# Patient Record
Sex: MALE | Race: WHITE | NOT HISPANIC OR LATINO | Employment: FULL TIME | URBAN - METROPOLITAN AREA
[De-identification: names, ages, dates, MRNs, and addresses within clinical notes are randomized per-mention and may not be internally consistent; named-entity substitution may affect disease eponyms.]

---

## 2017-08-25 ENCOUNTER — HOSPITAL ENCOUNTER (OUTPATIENT)
Facility: HOSPITAL | Age: 47
Discharge: HOME | End: 2017-08-25
Attending: EMERGENCY MEDICINE | Admitting: INTERNAL MEDICINE

## 2017-08-25 VITALS
SYSTOLIC BLOOD PRESSURE: 134 MMHG | RESPIRATION RATE: 18 BRPM | TEMPERATURE: 98 F | BODY MASS INDEX: 33.32 KG/M2 | OXYGEN SATURATION: 95 % | HEIGHT: 71 IN | WEIGHT: 238 LBS | DIASTOLIC BLOOD PRESSURE: 88 MMHG | HEART RATE: 54 BPM

## 2017-08-25 DIAGNOSIS — R07.9 CHEST PAIN: ICD-10-CM

## 2017-08-25 DIAGNOSIS — R07.9 CHEST PAIN, UNSPECIFIED TYPE: Primary | ICD-10-CM

## 2017-08-25 LAB
ALBUMIN SERPL BCP-MCNC: 3.7 G/DL
ALP SERPL-CCNC: 71 U/L
ALT SERPL W/O P-5'-P-CCNC: 28 U/L
AMPHET+METHAMPHET UR QL: NEGATIVE
ANION GAP SERPL CALC-SCNC: 10 MMOL/L
AST SERPL-CCNC: 28 U/L
BARBITURATES UR QL SCN>200 NG/ML: NEGATIVE
BASOPHILS # BLD AUTO: 0.3 K/UL
BASOPHILS NFR BLD: 3.5 %
BENZODIAZ UR QL SCN>200 NG/ML: NEGATIVE
BILIRUB SERPL-MCNC: 0.7 MG/DL
BNP SERPL-MCNC: 21 PG/ML
BUN SERPL-MCNC: 19 MG/DL
BZE UR QL SCN: NEGATIVE
CALCIUM SERPL-MCNC: 9 MG/DL
CANNABINOIDS UR QL SCN: NEGATIVE
CHLORIDE SERPL-SCNC: 107 MMOL/L
CHOLEST/HDLC SERPL: 3.3 {RATIO}
CO2 SERPL-SCNC: 23 MMOL/L
CREAT SERPL-MCNC: 1 MG/DL
CREAT UR-MCNC: 162.6 MG/DL
D DIMER PPP IA.FEU-MCNC: 0.2 MG/L FEU
DIASTOLIC DYSFUNCTION: NO
DIFFERENTIAL METHOD: ABNORMAL
EOSINOPHIL # BLD AUTO: 0.3 K/UL
EOSINOPHIL NFR BLD: 3 %
ERYTHROCYTE [DISTWIDTH] IN BLOOD BY AUTOMATED COUNT: 13.6 %
EST. GFR  (AFRICAN AMERICAN): >60 ML/MIN/1.73 M^2
EST. GFR  (NON AFRICAN AMERICAN): >60 ML/MIN/1.73 M^2
GLUCOSE SERPL-MCNC: 100 MG/DL
HCT VFR BLD AUTO: 45.1 %
HDL/CHOLESTEROL RATIO: 29.9 %
HDLC SERPL-MCNC: 184 MG/DL
HDLC SERPL-MCNC: 55 MG/DL
HGB BLD-MCNC: 15.1 G/DL
LDLC SERPL CALC-MCNC: 103 MG/DL
LYMPHOCYTES # BLD AUTO: 2.7 K/UL
LYMPHOCYTES NFR BLD: 32.2 %
MCH RBC QN AUTO: 30.6 PG
MCHC RBC AUTO-ENTMCNC: 33.5 G/DL
MCV RBC AUTO: 91 FL
METHADONE UR QL SCN>300 NG/ML: NEGATIVE
MONOCYTES # BLD AUTO: 0.7 K/UL
MONOCYTES NFR BLD: 8.4 %
NEUTROPHILS # BLD AUTO: 4.5 K/UL
NEUTROPHILS NFR BLD: 52.9 %
NONHDLC SERPL-MCNC: 129 MG/DL
OPIATES UR QL SCN: NEGATIVE
PCP UR QL SCN>25 NG/ML: NEGATIVE
PLATELET # BLD AUTO: 210 K/UL
PMV BLD AUTO: 8.5 FL
POTASSIUM SERPL-SCNC: 4.3 MMOL/L
PROT SERPL-MCNC: 7 G/DL
RBC # BLD AUTO: 4.94 M/UL
SODIUM SERPL-SCNC: 140 MMOL/L
TOXICOLOGY INFORMATION: NORMAL
TRIGL SERPL-MCNC: 130 MG/DL
TROPONIN I SERPL DL<=0.01 NG/ML-MCNC: 0.02 NG/ML
TROPONIN I SERPL DL<=0.01 NG/ML-MCNC: <0.006 NG/ML
TSH SERPL DL<=0.005 MIU/L-ACNC: 1.73 UIU/ML
WBC # BLD AUTO: 8.5 K/UL

## 2017-08-25 PROCEDURE — 80053 COMPREHEN METABOLIC PANEL: CPT

## 2017-08-25 PROCEDURE — 80307 DRUG TEST PRSMV CHEM ANLYZR: CPT

## 2017-08-25 PROCEDURE — 84484 ASSAY OF TROPONIN QUANT: CPT | Mod: 91

## 2017-08-25 PROCEDURE — G0378 HOSPITAL OBSERVATION PER HR: HCPCS

## 2017-08-25 PROCEDURE — 99285 EMERGENCY DEPT VISIT HI MDM: CPT

## 2017-08-25 PROCEDURE — 25000003 PHARM REV CODE 250: Performed by: EMERGENCY MEDICINE

## 2017-08-25 PROCEDURE — 85025 COMPLETE CBC W/AUTO DIFF WBC: CPT

## 2017-08-25 PROCEDURE — 85379 FIBRIN DEGRADATION QUANT: CPT

## 2017-08-25 PROCEDURE — 84443 ASSAY THYROID STIM HORMONE: CPT

## 2017-08-25 PROCEDURE — 99235 HOSP IP/OBS SAME DATE MOD 70: CPT | Mod: ,,, | Performed by: INTERNAL MEDICINE

## 2017-08-25 PROCEDURE — 94761 N-INVAS EAR/PLS OXIMETRY MLT: CPT

## 2017-08-25 PROCEDURE — 36415 COLL VENOUS BLD VENIPUNCTURE: CPT

## 2017-08-25 PROCEDURE — 80061 LIPID PANEL: CPT

## 2017-08-25 PROCEDURE — 99900035 HC TECH TIME PER 15 MIN (STAT)

## 2017-08-25 PROCEDURE — 83880 ASSAY OF NATRIURETIC PEPTIDE: CPT

## 2017-08-25 PROCEDURE — 93005 ELECTROCARDIOGRAM TRACING: CPT

## 2017-08-25 RX ORDER — SODIUM CHLORIDE 0.9 % (FLUSH) 0.9 %
3 SYRINGE (ML) INJECTION EVERY 8 HOURS
Status: DISCONTINUED | OUTPATIENT
Start: 2017-08-25 | End: 2017-08-25 | Stop reason: HOSPADM

## 2017-08-25 RX ORDER — ASPIRIN 325 MG
325 TABLET ORAL
Status: COMPLETED | OUTPATIENT
Start: 2017-08-25 | End: 2017-08-25

## 2017-08-25 RX ORDER — AMOXICILLIN 250 MG
1 CAPSULE ORAL 2 TIMES DAILY
Status: DISCONTINUED | OUTPATIENT
Start: 2017-08-25 | End: 2017-08-25 | Stop reason: HOSPADM

## 2017-08-25 RX ORDER — MORPHINE SULFATE 2 MG/ML
2 INJECTION, SOLUTION INTRAMUSCULAR; INTRAVENOUS EVERY 4 HOURS PRN
Status: DISCONTINUED | OUTPATIENT
Start: 2017-08-25 | End: 2017-08-25 | Stop reason: HOSPADM

## 2017-08-25 RX ORDER — ACETAMINOPHEN 325 MG/1
650 TABLET ORAL EVERY 4 HOURS PRN
Status: DISCONTINUED | OUTPATIENT
Start: 2017-08-25 | End: 2017-08-25 | Stop reason: HOSPADM

## 2017-08-25 RX ORDER — ONDANSETRON 2 MG/ML
4 INJECTION INTRAMUSCULAR; INTRAVENOUS EVERY 8 HOURS PRN
Status: DISCONTINUED | OUTPATIENT
Start: 2017-08-25 | End: 2017-08-25 | Stop reason: HOSPADM

## 2017-08-25 RX ORDER — ASPIRIN 325 MG
325 TABLET ORAL DAILY
Status: DISCONTINUED | OUTPATIENT
Start: 2017-08-25 | End: 2017-08-25 | Stop reason: HOSPADM

## 2017-08-25 RX ADMIN — ASPIRIN 325 MG ORAL TABLET 325 MG: 325 PILL ORAL at 04:08

## 2017-08-25 RX ADMIN — LIDOCAINE HYDROCHLORIDE: 20 SOLUTION ORAL; TOPICAL at 05:08

## 2017-08-25 NOTE — PLAN OF CARE
08/25/17 1339   Final Note   Assessment Type Final Discharge Note   Discharge Disposition Home

## 2017-08-25 NOTE — ED PROVIDER NOTES
Encounter Date: 8/25/2017       History     Chief Complaint   Patient presents with    Chest Pain     x 1 hour with progressive worsening; SOB; episode of CP yesterday with diaphoresis     HPI   Patient is a 47-year-old man with no significant past medical history presents emergency department complaining of episodes of chest pain.  Patient states yesterday morning while driving he had a episode of chest pain that lasted approximately one hour.  The pain was described as substernal, deep pain which was severe and associated with shortness of breath, nausea and diaphoresis.  He began to have another episode this morning that awoke him from sleep around 3:30 AM similar to yesterday's episode yet not as severe.  He denies tobacco use.  No family history of MIs.  Review of patient's allergies indicates:   Allergen Reactions    Pcn [penicillins] Anaphylaxis     History reviewed. No pertinent past medical history.  History reviewed. No pertinent surgical history.  History reviewed. No pertinent family history.  Social History   Substance Use Topics    Smoking status: Never Smoker    Smokeless tobacco: Never Used    Alcohol use Not on file     Review of Systems  REVIEW OF SYSTEMS  CONSTITUTIONAL: Negative for fever.  Positive for diaphoresis  HEENT:  Negative for sore throat.   HEART:   Positive for chest pain  LUNG:  Positive for shortness of breath.  ABDOMEN: Positive for nausea  :  No discharge, dysuria  EXTREMITIES:  No swelling  NEURO:  Negative for weakness.   SKIN:  Negative for rash.  Psych: No depression  HEME: Does not bruise/bleed easily.           Physical Exam     Initial Vitals [08/25/17 0438]   BP Pulse Resp Temp SpO2   134/83 68 16 97.6 °F (36.4 °C) --      MAP       100         Physical Exam  Physical Exam   Nurses notes and vitals reviewed.  Constitutional:Oriented to person, place, and time. Appears well-developed and well-nourished and in no acute distress. Answering all questions appropriate    HEENT: PERRL, EOMI, Conjunctivae are normal. Moist mucous membranes. Normocephalic. Atraumatic, no acute, traumatic or infectious process noted.  Neck: Normal range of motion, supple, no JVD.  Cardiovascular: Normal rate, regular rhythm, normal heart sounds and intact distal pulses.   Pulmonary/Chest: Effort normal and breath sounds normal. No respiratory distress. No wheezes,  rales or ronchi.   Abdominal: Soft, no distension. There is no tenderness, rebound or gaurding.  No Odonnell's, Rovsing's, or McBurney's point tenderness.  No CVA tenderness.   Back:  No midline TTP; no acute abnormal, traumatic or infectious process noted  Musculoskeletal: Moves all extremities well.  Full range of motion.  5/5 strength.  No sensory deficits.  No C/C/E.  2+ pulses throughout  Neurological: Alert and oriented to person, place, and time. Cranial nerves II through XII are grossly intact without anyfocal motor, sensory, and cerebellar findings.  Skin: Skin is warm and dry, no rashes.  Psych: Full affect, cooperative      ED Course   Procedures  Labs Reviewed   CBC W/ AUTO DIFFERENTIAL - Abnormal; Notable for the following:        Result Value    MPV 8.5 (*)     Baso # 0.30 (*)     Basophil% 3.5 (*)     All other components within normal limits   COMPREHENSIVE METABOLIC PANEL   TROPONIN I   B-TYPE NATRIURETIC PEPTIDE   DRUG SCREEN PANEL, URINE EMERGENCY   D DIMER, QUANTITATIVE   TROPONIN I   TSH   LIPID PANEL     EKG Readings: (Independently Interpreted)   Initial Reading: No STEMI.   Sinus bradycardia, 58 bpm, normal ST segments, normal T waves, otherwise normal EKG          Medical Decision Making:   History:   Old Medical Records: I decided to obtain old medical records.               Assessment/Plan:  Kong Lamb is a 47 y.o. male with chest pain.    This is an emergent evaluation.  Patient is complaining of chest pain.  My differential diagnosis includes: Acute MI, unstable angina, stable angina, congestive heart failure,  pneumonia, pneumothorax, COPD/asthma, bronchitis, and mass.    Clinically, the patient does not have any symptoms of bronchitis, asthma, or COPD exacerbation.    An EKG was performed and was negative for ST segment elevation.  A chest x-ray was performed and was negative for signs of heart failure or pulmonary edema.  There was no evidence of pneumonia or pneumothorax or mass lesion.    Cardiac markers-troponin and BNP-are both negative.  No evidence for NSTEMI or CHF.    This patient has multiple cardiac risk factors. Risk stratification with serial cardiac markers and stress testing is warranted in this patient.  I believe the patient should be admitted for observation to the definitively rule out acute coronary syndrome.    I discussed the patient's presentation and workup with the hospitalist who agrees with placing the patient in the hospital.  I have placed orders for the hospitalist.     iTto Chau M.D. 1:31 PM 8/29/2017           ED Course     Clinical Impression:   The primary encounter diagnosis was Chest pain, unspecified type. A diagnosis of Chest pain was also pertinent to this visit.                           Tito Chau MD  08/29/17 [id number]

## 2017-08-25 NOTE — NURSING
Pt arrived to floor from ER at 0825. SB on the monitor in the high 50's. VS stable, see flowsheets. Pt states the GI cocktail in ER improved his CP symptoms. Consult made to Dr. Hartley, Dr. Stewart not on call.

## 2017-08-25 NOTE — H&P
PCP: Primary Doctor No    History & Physical    Chief Complaint: Chest pain - 1 hr PTA    History of Present Illness:  Patient is a 47 y.o. male admitted to Hospitalist Service from [facility] with complaint of chest pain started 1 hr PTA. Patient reported no significant PMH. Patient presented with episodes of chest pain.  Patient stated yesterday morning while driving he had a episode of chest pain that lasted approximately one hour.  The pain was described as substernal, deep pain which was severe and associated with shortness of breath, nausea and diaphoresis.  He began to have another episode this morning that awoke him from sleep around 3:30 AM similar to yesterday's episode yet not as severe.  He denies tobacco use.  Patient denied shortness of breath, abdominal pain, nausea, vomiting, headache, vision changes, focal neuro-deficits, cough or fever.    No past medical history on file.  No past surgical history on file.  No family history on file.  Social History   Substance Use Topics    Smoking status: Not on file    Smokeless tobacco: Not on file    Alcohol use Not on file      Review of patient's allergies indicates:   Allergen Reactions    Pcn [penicillins] Anaphylaxis       (Not in a hospital admission)  Review of Systems:  Constitutional: no fever or chills  Eyes: no visual changes  Ears, nose, mouth, throat, and face: no nasal congestion or sore throat  Respiratory: no cough or shorness of breath  Cardiovascular: see HPI  Gastrointestinal: no nausea or vomiting, no abdominal pain or change in bowel habits  Genitourinary: no hematuria or dysuria  Integument/breast: no rash or pruritis  Hematologic/lymphatic: no easy bruising or lymphadenopathy  Musculoskeletal: no arthralgias or myalgias  Neurological: no seizures or tremors.  Behavioral/Psych: no auditory or visual hallucinations  Endocrine: no heat or cold intolerance     OBJECTIVE:     Vital Signs (Most Recent)  Temp:  97.6 °F (36.4 °C) (08/25/17 0438)  Pulse: (!) 52 (08/25/17 0733)  Resp: 16 (08/25/17 0438)  BP: 111/60 (08/25/17 0733)  SpO2: 98 % (08/25/17 0733)    Physical Exam:  General appearance: well developed, appears stated age  Head: normocephalic, atraumatic  Eyes:  conjunctivae/corneas clear. PERRL.  Nose: Nares normal. Septum midline.  Throat: lips, mucosa, and tongue normal; teeth and gums normal, no throat erythema.  Neck: supple, symmetrical, trachea midline, no JVD and thyroid not enlarged, symmetric, no tenderness/mass/nodules  Lungs:  clear to auscultation bilaterally and normal respiratory effort  Chest wall: no tenderness  Heart: regular rate and rhythm, S1, S2 normal, no murmur, click, rub or gallop  Abdomen: soft, non-tender non-distented; bowel sounds normal; no masses,  no organomegaly  Extremities: no cyanosis, clubbing or edema.   Pulses: 2+ and symmetric  Skin: Skin color, texture, turgor normal. No rashes or lesions.  Lymph nodes: Cervical, supraclavicular, and axillary nodes normal.  Neurologic: Normal strength and tone. No focal numbness or weakness. CNII-XII intact.      Laboratory:   CBC:   Recent Labs  Lab 08/25/17 0457   WBC 8.50   RBC 4.94   HGB 15.1   HCT 45.1      MCV 91   MCH 30.6   MCHC 33.5     CMP:   Recent Labs  Lab 08/25/17 0457      CALCIUM 9.0   ALBUMIN 3.7   PROT 7.0      K 4.3   CO2 23      BUN 19   CREATININE 1.0   ALKPHOS 71   ALT 28   AST 28   BILITOT 0.7   Cardiac markers:   Recent Labs  Lab 08/25/17  0643   TROPONINI <0.006   D-dimer: 0.20  Microbiology Results (last 7 days)     ** No results found for the last 168 hours. **        Microbiology Results (last 7 days)     ** No results found for the last 168 hours. **        Diagnostic Results:  Chest X-Ray: No acute cardiopulmonary disease.      Assessment/Plan:     Active Hospital Problems    Diagnosis  POA    Chest pain [R07.9] - recurrent  Yes      Supplemental O2 via nasal canula; titrate O2  saturation to >92%.  Serial Cardiac enzymes × 3.  Tele-monitoring.   Cardiology Consultation.  Check fasting lipid panel and EKG in AM.  Use Nitroglycerine PRN Chest pain.  Na restriction <2g/d.  Gastric ulcer prophylaxis with gastric acid suppressant.   Deep venous thrombosis prophylaxis.  See Orders.  Dixon Collado MD  Department of Hospital Medicine   [facility]

## 2017-08-25 NOTE — ED NOTES
"Patient identifiers for Kong Lamb checked and correct.  LOC:  Patient is awake, alert, and aware of environment with an appropriate affect. Patient is oriented x 3 and speaking appropriately.  APPEARANCE:  Patient appears anxious but in no acute distress. Patient is clean and well groomed, patient's clothing is properly fastened.  SKIN:  The skin is warm and dry. Patient has normal skin turgor and moist mucus membranes. Skin is intact; no bruising or breakdown noted.  MUSCULOSKELETAL:  Patient is moving all extremities well, no obvious deformities noted. Pulses intact.   RESPIRATORY:  Airway is open and patent. Respirations are spontaneous and non-labored with normal effort and rate.  CARDIAC:  Patient has a normal rate and rhythm. No peripheral edema noted. Capillary refill < 3 seconds. Midsternal chest pain, nonradiating.   ABDOMEN:  No distention noted.  Soft and non-tender upon palpation. Nausea noted.  NEUROLOGICAL:  PERRL. Facial expression is symmetrical. Hand grasps are equal bilaterally. Normal sensation in all extremities when touched with finger.  Allergies reported:    Review of patient's allergies indicates:   Allergen Reactions    Pcn [penicillins] Anaphylaxis     OTHER NOTES: Pt presents to ER for evaluation of midsternal chest pain that started yesterday while driving. Pt reports associated nausea and diaphoresis during episode and describes pain as stabbing and makes him "hunch over in pain". Heart sounds normal, denies SOB. Pt in bed, locked, lowest position, side rails up, no needs noted at this time. NAD, VSS, will continue to monitor.      "

## 2017-08-25 NOTE — ED NOTES
Assumed care:  Patient awake, alert and oriented x 3, skin warm and dry, in NAD.  Attempted to call report.  Nurse will call back.

## 2017-08-25 NOTE — DISCHARGE SUMMARY
Discharge Summary  Hospital Medicine    Admit Date: 8/25/2017    Date and Time: 8/25/20176:13 PM    Discharge Attending Physician: Dixon Collado MD    Primary Care Physician: Primary Doctor No    Diagnoses:  Active Hospital Problems    Diagnosis  POA    *Chest pain [R07.9]  Yes      Resolved Hospital Problems    Diagnosis Date Resolved POA   No resolved problems to display.     Discharged Condition: Good    Hospital Course:   Patient is a 47 y.o. male admitted to Hospitalist Service from [facility] with complaint of chest pain started 1 hr PTA. Patient reported no significant PMH. Patient presented with episodes of chest pain.  Patient stated previous day morning while driving he had a episode of chest pain that lasted approximately one hour.  The pain was described as substernal, deep pain which was severe and associated with shortness of breath, nausea and diaphoresis.  He began to have another episode this morning that awoke him from sleep around 3:30 AM similar to yesterday's episode yet not as severe.  He denies tobacco use.  Patient denied shortness of breath, abdominal pain, nausea, vomiting, headache, vision changes, focal neuro-deficits, cough or fever. Patient was admitted to Hospitalist medicine service. Patient was evaluated by Dr. AMADA Hartley. Patient was monitored on telemetry medical floor, serial cardiac enzymes remained negative. Patient was evaluated by cardiologist and had further cardiac workup as mentioned below, which was negative for acute ischemia. Patient was given a trial prescription of PPI. Patient's symptoms resolved. Patient was discharged home in stable condition with following discharge plan of care.     Consults: Dr. AMADA Hartley    Significant Diagnostic Studies:   CXR: No acute cardiopulmonary disease.    ETT: 1. The EKG portion of this study is negative for ischemia at a moderate workload, and peak heart rate of 148 bpm (86% of predicted).   2. Blood pressure  response to exercise was normal (Presenting BP: 136/94 Peak BP: 169/78).   3. No significant arrhythmias were present.   4. There were no symptoms of chest discomfort or significant dyspnea throughout the protocol.   5. The Ball treadmill score was 8 suggesting a low probability for future cardiovascular events.  Microbiology Results (last 7 days)     ** No results found for the last 168 hours. **        Special Treatments/Procedures: None  Disposition: Home or Self Care    Medications:  Protonix 40 mg po q day # 30    Discharge Procedure Orders  Diet general   Order Comments: Cardiac/ 2 gram sodium low cholesterol diet     Other restrictions (specify):   Order Comments: Fall precautions     Call MD for:   Order Comments: For worsening symptoms, chest pain, shortness of breath, increased abdominal pain, high grade fever, stroke or stroke like symptoms, immediately go to the nearest Emergency Room or call 911 as soon as possible.       Follow-up Information     PCP.    Why:  As needed

## 2017-08-26 NOTE — CONSULTS
This 47-year-old white gentleman is admitted with chest discomfort.    The patient was enroute to work in [place] when around 5:00 a.m., the   patient developed severe mid lower retrosternal discomfort - 6 to 8 inch   diameter that was quite intense.  There was some associated diaphoresis and   shortness of breath.  He did have some nausea and gagging.  There was no   vomiting.  There was no discomfort in the throat, shoulders or into the arms.    Leaning backward aggravated the pain and he had to hunch forward and grab the   steering wheel for some relief of symptoms.  Symptoms lasted approximately 1 to   1-1/2 hours.  He laid down at work, but noticed persistent soreness in the chest   through the day.  He felt weak additionally.  He awoke at 3:00 a.m. this   morning with mild discomfort again.  He was given a GI cocktail in the ED with   some relief of symptoms.  The patient reports that his chest pain was similar to   his gallbladder pain except that the gallbladder pain was in the abdomen.  He   has had a cholecystectomy.  The patient reports that he got extremely anxious   about the chest pain and that worsened his symptoms.  He has never had similar   symptoms before.  The patient's exercise capacity is good.  He is active; there   is no exertional chest pain.    PAST HISTORY:  Cholecystectomy in .  There is no history of hypertension or   diabetes mellitus.    SOCIAL HISTORY:  The patient has never been a smoker.  He has an occasional   drink.  He is a  and works on [employer] extra.  He has four   children.  There is no history of drug use.    FAMILY HISTORY:  Father  at 57 of cirrhosis of the liver.  He had no   heart disease.  He has no information on the health status of his mother who is   61.  He has a brother of 37 and a sister at 34 -- no information.    REVIEW OF SYSTEMS:  The patient has occasional headaches.  He has sinus   congestion and postnasal drip, which has  been fairly stable.  He has occasional   a.m. hacking cough with expectoration of sputum.  He denies asthma, wheezing or   MDI use.  He has occasional reflux precipitated by late meal or red gravies.    There is no history of hematemesis, hematochezia or melena.  There is no history   of hematuria.  The patient denies claudication pain.    MEDICATIONS:  Nil.    ALLERGIES:  There are no known allergies.    PHYSICAL EXAMINATION:  GENERAL:  This is a well-built white gentleman in no acute distress.  VITAL SIGNS:  Blood pressure 111/60 -- 134/88, heart rate 55 -- 68 BPM.  EYES:  No conjunctival pallor or scleral icterus.  THROAT:  No masses are observed.  NECK:  Carotids equal without bruits.  There is no jugular venous distention or   thyromegaly.  CHEST:  Air entry is equal bilaterally.  There were no rales or rhonchi.  HEART:  S1, S2 were well heard.  There were no murmurs, gallops or rubs.  There   is tenderness on palpation of the retrosternal area and the costochondral   junctions.  It does not reproduce the patient's chest pain, however.  ABDOMEN:  Protuberant.  Liver edge is not palpable.  Mild tenderness noted over   the xiphisternum.  EXTREMITIES:  No clubbing, cyanosis or edema.  Dorsalis pedis and posterior   tibial pulses are well felt.    LABORATORY DATA:  Hemoglobin 15.1; hematocrit 45.1; WBC count is 8500; platelet   count 210,000; granulocytes 53%; lymphocytes 32%.  D-dimer 0.2.  Troponins are   0.006 times 2 and 0.017.  BNP 21.  TSH 1.734.  Cholesterol 184, triglycerides   130, HDL 55, , sodium 140, potassium 4.3, BUN 19, creatinine 1.0.  LFTs   are within normal limits.    IMPRESSION:  1.  Chest pain of uncertain etiology.  2.  Status post cholecystectomy; possible gastroesophageal reflux disease.  3.  Stress test.      MT/FRACISCO  dd: 08/25/2017 11:04:04 (CDT)  td: 08/25/2017 21:53:02 (CDT)  Doc ID   #[id number]  Job ID #[id number]    CC:

## 2018-02-12 ENCOUNTER — HOSPITAL ENCOUNTER (INPATIENT)
Facility: HOSPITAL | Age: 48
LOS: 3 days | Discharge: HOME | DRG: 603 | End: 2018-02-15
Attending: FAMILY MEDICINE | Admitting: INTERNAL MEDICINE

## 2018-02-12 DIAGNOSIS — L03.115 CELLULITIS OF RIGHT LOWER EXTREMITY: Primary | ICD-10-CM

## 2018-02-12 LAB
ALBUMIN SERPL BCP-MCNC: 3.3 G/DL
ALP SERPL-CCNC: 105 U/L
ALT SERPL W/O P-5'-P-CCNC: 30 U/L
ANION GAP SERPL CALC-SCNC: 11 MMOL/L
AST SERPL-CCNC: 32 U/L
BASOPHILS # BLD AUTO: 0.04 K/UL
BASOPHILS NFR BLD: 0.2 %
BILIRUB SERPL-MCNC: 0.7 MG/DL
BUN SERPL-MCNC: 12 MG/DL
CALCIUM SERPL-MCNC: 9.8 MG/DL
CHLORIDE SERPL-SCNC: 100 MMOL/L
CO2 SERPL-SCNC: 29 MMOL/L
CREAT SERPL-MCNC: 1.1 MG/DL
DIFFERENTIAL METHOD: ABNORMAL
EOSINOPHIL # BLD AUTO: 0.1 K/UL
EOSINOPHIL NFR BLD: 0.6 %
ERYTHROCYTE [DISTWIDTH] IN BLOOD BY AUTOMATED COUNT: 12.3 %
EST. GFR  (AFRICAN AMERICAN): >60 ML/MIN/1.73 M^2
EST. GFR  (NON AFRICAN AMERICAN): >60 ML/MIN/1.73 M^2
GLUCOSE SERPL-MCNC: 88 MG/DL
HCT VFR BLD AUTO: 43.8 %
HGB BLD-MCNC: 15.1 G/DL
IMM GRANULOCYTES # BLD AUTO: 0.23 K/UL
IMM GRANULOCYTES NFR BLD AUTO: 1.4 %
LACTATE SERPL-SCNC: 1.4 MMOL/L
LYMPHOCYTES # BLD AUTO: 2.9 K/UL
LYMPHOCYTES NFR BLD: 17.8 %
MCH RBC QN AUTO: 30.6 PG
MCHC RBC AUTO-ENTMCNC: 34.5 G/DL
MCV RBC AUTO: 89 FL
MONOCYTES # BLD AUTO: 1.5 K/UL
MONOCYTES NFR BLD: 9.2 %
NEUTROPHILS # BLD AUTO: 11.6 K/UL
NEUTROPHILS NFR BLD: 70.8 %
NRBC BLD-RTO: 0 /100 WBC
PLATELET # BLD AUTO: 284 K/UL
PMV BLD AUTO: 10.8 FL
POTASSIUM SERPL-SCNC: 3.9 MMOL/L
PROT SERPL-MCNC: 8 G/DL
RBC # BLD AUTO: 4.94 M/UL
SODIUM SERPL-SCNC: 140 MMOL/L
WBC # BLD AUTO: 16.43 K/UL

## 2018-02-12 PROCEDURE — 96375 TX/PRO/DX INJ NEW DRUG ADDON: CPT

## 2018-02-12 PROCEDURE — 85025 COMPLETE CBC W/AUTO DIFF WBC: CPT

## 2018-02-12 PROCEDURE — 63600175 PHARM REV CODE 636 W HCPCS: Performed by: PHYSICIAN ASSISTANT

## 2018-02-12 PROCEDURE — 99284 EMERGENCY DEPT VISIT MOD MDM: CPT | Mod: ,,, | Performed by: PHYSICIAN ASSISTANT

## 2018-02-12 PROCEDURE — 87040 BLOOD CULTURE FOR BACTERIA: CPT | Mod: 59

## 2018-02-12 PROCEDURE — 80053 COMPREHEN METABOLIC PANEL: CPT

## 2018-02-12 PROCEDURE — 12000002 HC ACUTE/MED SURGE SEMI-PRIVATE ROOM

## 2018-02-12 PROCEDURE — 99284 EMERGENCY DEPT VISIT MOD MDM: CPT | Mod: 25

## 2018-02-12 PROCEDURE — 83605 ASSAY OF LACTIC ACID: CPT

## 2018-02-12 PROCEDURE — S0077 INJECTION, CLINDAMYCIN PHOSP: HCPCS | Performed by: PHYSICIAN ASSISTANT

## 2018-02-12 PROCEDURE — 96365 THER/PROPH/DIAG IV INF INIT: CPT

## 2018-02-12 PROCEDURE — 25000003 PHARM REV CODE 250: Performed by: PHYSICIAN ASSISTANT

## 2018-02-12 PROCEDURE — 96361 HYDRATE IV INFUSION ADD-ON: CPT

## 2018-02-12 RX ORDER — CLINDAMYCIN PHOSPHATE 600 MG/50ML
600 INJECTION, SOLUTION INTRAVENOUS
Status: COMPLETED | OUTPATIENT
Start: 2018-02-12 | End: 2018-02-12

## 2018-02-12 RX ORDER — KETOROLAC TROMETHAMINE 30 MG/ML
15 INJECTION, SOLUTION INTRAMUSCULAR; INTRAVENOUS
Status: COMPLETED | OUTPATIENT
Start: 2018-02-12 | End: 2018-02-12

## 2018-02-12 RX ADMIN — SODIUM CHLORIDE 1000 ML: 0.9 INJECTION, SOLUTION INTRAVENOUS at 08:02

## 2018-02-12 RX ADMIN — CLINDAMYCIN IN 5 PERCENT DEXTROSE 600 MG: 12 INJECTION, SOLUTION INTRAVENOUS at 08:02

## 2018-02-12 RX ADMIN — KETOROLAC TROMETHAMINE 15 MG: 30 INJECTION, SOLUTION INTRAMUSCULAR at 08:02

## 2018-02-13 PROCEDURE — 25000003 PHARM REV CODE 250: Performed by: INTERNAL MEDICINE

## 2018-02-13 PROCEDURE — 12000002 HC ACUTE/MED SURGE SEMI-PRIVATE ROOM

## 2018-02-13 PROCEDURE — 63600175 PHARM REV CODE 636 W HCPCS: Performed by: INTERNAL MEDICINE

## 2018-02-13 PROCEDURE — 99223 1ST HOSP IP/OBS HIGH 75: CPT | Mod: AI,,, | Performed by: INTERNAL MEDICINE

## 2018-02-13 RX ORDER — ONDANSETRON 8 MG/1
8 TABLET, ORALLY DISINTEGRATING ORAL EVERY 8 HOURS PRN
Status: DISCONTINUED | OUTPATIENT
Start: 2018-02-13 | End: 2018-02-15 | Stop reason: HOSPADM

## 2018-02-13 RX ORDER — ENOXAPARIN SODIUM 100 MG/ML
40 INJECTION SUBCUTANEOUS EVERY 24 HOURS
Status: DISCONTINUED | OUTPATIENT
Start: 2018-02-13 | End: 2018-02-15 | Stop reason: HOSPADM

## 2018-02-13 RX ORDER — SODIUM CHLORIDE 0.9 % (FLUSH) 0.9 %
5 SYRINGE (ML) INJECTION
Status: DISCONTINUED | OUTPATIENT
Start: 2018-02-13 | End: 2018-02-15 | Stop reason: HOSPADM

## 2018-02-13 RX ORDER — CIPROFLOXACIN 2 MG/ML
400 INJECTION, SOLUTION INTRAVENOUS
Status: DISCONTINUED | OUTPATIENT
Start: 2018-02-13 | End: 2018-02-14

## 2018-02-13 RX ORDER — OXYCODONE HYDROCHLORIDE 5 MG/1
5 TABLET ORAL EVERY 6 HOURS PRN
Status: DISCONTINUED | OUTPATIENT
Start: 2018-02-13 | End: 2018-02-15 | Stop reason: HOSPADM

## 2018-02-13 RX ORDER — ACETAMINOPHEN 325 MG/1
650 TABLET ORAL EVERY 4 HOURS PRN
Status: DISCONTINUED | OUTPATIENT
Start: 2018-02-13 | End: 2018-02-15 | Stop reason: HOSPADM

## 2018-02-13 RX ADMIN — ENOXAPARIN SODIUM 40 MG: 100 INJECTION SUBCUTANEOUS at 05:02

## 2018-02-13 RX ADMIN — CIPROFLOXACIN 400 MG: 2 INJECTION, SOLUTION INTRAVENOUS at 08:02

## 2018-02-13 RX ADMIN — VANCOMYCIN HYDROCHLORIDE 1750 MG: 5 INJECTION, POWDER, LYOPHILIZED, FOR SOLUTION INTRAVENOUS at 04:02

## 2018-02-13 RX ADMIN — VANCOMYCIN HYDROCHLORIDE 1750 MG: 5 INJECTION, POWDER, LYOPHILIZED, FOR SOLUTION INTRAVENOUS at 05:02

## 2018-02-13 RX ADMIN — CIPROFLOXACIN 400 MG: 2 INJECTION, SOLUTION INTRAVENOUS at 07:02

## 2018-02-13 NOTE — ASSESSMENT & PLAN NOTE
Patient with area of ruptured abscess/cellulitis of the R lower leg. No fluctuance of the wound and it is not draining. Patient has TTP around the site. He also has some pain/warmth/erythema of his left lower extremity. Leukocytosis.   -IV Vancomycin   -IV Cipro  Abx given concern for possible abscess with history of severe PCN allergy.   Consider I and D if no significant improvement or developing fluctuance.

## 2018-02-13 NOTE — ED PROVIDER NOTES
"Encounter Date: 2/12/2018       History     Chief Complaint   Patient presents with    Leg Pain     Pt arrives for evaluation of redness to open area on RLE - states it was a "pimple" that he broke open 3 days ago and it is now red and hot with streaks of redness travelling up leg - no drainage noted - swelling to foot and calf or right leg - states entire right leg now is painful with pain starting in left foot today     Patient is a 47-year-old male presenting to the ER for evaluation of right lower extremity pain.  Patient states that about 3 days ago he popped a pimple on his right lower leg.  Since then he has noticed increasing redness and swelling radiating up to his right calf.  He states that is exquisitely tender and has increasing pain upon ambulation.  He denies any recorded fever but has chills.  He denies recent antibiotic us he has not taken any medication for his symptoms.  No history of diabetes      The history is provided by the patient.     Review of patient's allergies indicates:   Allergen Reactions    Pcn [penicillins] Anaphylaxis     History reviewed. No pertinent past medical history.  Past Surgical History:   Procedure Laterality Date    CHOLECYSTECTOMY       History reviewed. No pertinent family history.  Social History   Substance Use Topics    Smoking status: Never Smoker    Smokeless tobacco: Never Used    Alcohol use No     Review of Systems   Constitutional: Positive for chills. Negative for fever.   HENT: Negative for congestion.    Respiratory: Negative for cough and shortness of breath.    Cardiovascular: Negative for chest pain.   Musculoskeletal: Negative for joint swelling.   Skin: Positive for wound.   Allergic/Immunologic: Negative for immunocompromised state.   Neurological: Negative for dizziness.   Hematological: Does not bruise/bleed easily.       Physical Exam     Initial Vitals [02/12/18 1822]   BP Pulse Resp Temp SpO2   135/76 83 20 98.1 °F (36.7 °C) 99 %      MAP "       95.67         Physical Exam    Constitutional: He appears well-developed and well-nourished.   HENT:   Head: Normocephalic and atraumatic.   Eyes: Conjunctivae and EOM are normal.   Neck: Neck supple.   Cardiovascular: Normal rate, regular rhythm, normal heart sounds and intact distal pulses.   Pulmonary/Chest: Breath sounds normal. No respiratory distress.   Musculoskeletal:   Erythema to the right lower extremity with ruptured abscess.  No active drainage.  Erythema does radiate to to the calf.  Swelling noted diffusely through the right lower extremity.  Tenderness on palpation.   Neurological: He is alert and oriented to person, place, and time.   Skin: Skin is warm and dry.         ED Course   Procedures  Labs Reviewed   CBC W/ AUTO DIFFERENTIAL - Abnormal; Notable for the following:        Result Value    WBC 16.43 (*)     Immature Granulocytes 1.4 (*)     Gran # (ANC) 11.6 (*)     Immature Grans (Abs) 0.23 (*)     Mono # 1.5 (*)     Lymph% 17.8 (*)     All other components within normal limits   COMPREHENSIVE METABOLIC PANEL - Abnormal; Notable for the following:     Albumin 3.3 (*)     All other components within normal limits   CULTURE, BLOOD   CULTURE, BLOOD   LACTIC ACID, PLASMA                   APC / Resident Notes:   Patient was in the ER probably upon arrival.  He is afebrile distress.  Physical examination does reveal ruptured abscess with erythema and streaking to the right calf that radiates from ankle to knee  Tender on palpation.  Consistent with cellulitis.  IV access established labs ordered fluids given.  Patient was initiated on clindamycin.  Toradol given for pain.  Leukocytosis with WBC of 16.4. Patient will be admitted to hospital medicine for further IV ABX treatment.     The care of this patient was overseen by attending physician who agrees with treatment, plan, and disposition.                ED Course      Clinical Impression:   The encounter diagnosis was Cellulitis of right  lower extremity.    Disposition:   Disposition: Admitted  Condition: Stable                        Misty Gill PA-C  02/12/18 0946

## 2018-02-13 NOTE — H&P
"[facility] Hospital Medicine  History & Physical    Patient Name: Kong Lamb  MRN: [id number]  Admission Date: 2/12/2018  Attending Physician: Maurice Tan MD   Primary Care Provider: Primary Doctor [facility] Medicine Team: Networked reference to record PCT  Maurice Tan MD     Patient information was obtained from patient and ER records.     Subjective:     Principal Problem:<principal problem not specified>    Chief Complaint:   Chief Complaint   Patient presents with    Leg Pain     Pt arrives for evaluation of redness to open area on RLE - states it was a "pimple" that he broke open 3 days ago and it is now red and hot with streaks of redness travelling up leg - no drainage noted - swelling to foot and calf or right leg - states entire right leg now is painful with pain starting in left foot today        HPI: 47 year old with no significant PMHx presents to [facility] with R lower leg cellulitis and wound. Patient states that he works as a  and has been having R lower leg pain for several days. He "popped" a pimple/abscess on his leg yesterday. He is usually on his feet most of the day but feels both of his feet has felt swollen and heavy. R lower extremity with small open sore and large area of surrounding cellulitis. Denies SOB, chest pain, pain on walking, nausea or vomiting.   No trauma to leg.   He also reports swelling and warmth of his left foot.   Denies IV drug use.   He does not taking any medications.     History reviewed. No pertinent past medical history.    Past Surgical History:   Procedure Laterality Date    CHOLECYSTECTOMY         Review of patient's allergies indicates:   Allergen Reactions    Pcn [penicillins] Anaphylaxis       No current facility-administered medications on file prior to encounter.      No current outpatient prescriptions on file prior to encounter.     Family History     None        Social History Main Topics    Smoking status: Never " Smoker    Smokeless tobacco: Never Used    Alcohol use No    Drug use: No    Sexual activity: Not on file     Review of Systems   Constitutional: Positive for fatigue. Negative for activity change and appetite change.   HENT: Negative for congestion and sore throat.    Eyes: Negative for discharge and itching.   Respiratory: Negative for apnea and chest tightness.    Cardiovascular: Negative for chest pain.   Gastrointestinal: Negative for abdominal distention and abdominal pain.   Endocrine: Negative for cold intolerance and heat intolerance.   Genitourinary: Negative for difficulty urinating and dysuria.   Musculoskeletal: Negative for arthralgias and myalgias.   Skin: Positive for color change, rash and wound.   Neurological: Negative for dizziness and facial asymmetry.   Hematological: Negative for adenopathy.   Psychiatric/Behavioral: Negative for agitation and behavioral problems.     Objective:     Vital Signs (Most Recent):  Temp: 98.8 °F (37.1 °C) (02/12/18 2220)  Pulse: 80 (02/12/18 2220)  Resp: 18 (02/12/18 2220)  BP: 113/60 (02/12/18 2220)  SpO2: 97 % (02/12/18 2220) Vital Signs (24h Range):  Temp:  [98.1 °F (36.7 °C)-98.8 °F (37.1 °C)] 98.8 °F (37.1 °C)  Pulse:  [80-83] 80  Resp:  [18-20] 18  SpO2:  [97 %-99 %] 97 %  BP: (113-135)/(60-76) 113/60     Weight: 108.9 kg (240 lb)  Body mass index is 33.47 kg/m².    Physical Exam   Constitutional: He is oriented to person, place, and time. He appears well-developed and well-nourished. No distress.   HENT:   Head: Normocephalic and atraumatic.   Eyes: EOM are normal. Pupils are equal, round, and reactive to light.   Neck: Normal range of motion. Neck supple.   Cardiovascular: Normal rate and regular rhythm.    Pulmonary/Chest: Effort normal and breath sounds normal. He has no wheezes. He has no rales.   Abdominal: Soft. Bowel sounds are normal. He exhibits no distension. There is no tenderness.   Musculoskeletal: Normal range of motion. He exhibits edema.    Neurological: He is alert and oriented to person, place, and time.   Skin: Skin is warm and dry. Rash noted. He is not diaphoretic.   R leg 5-7 cm area of erythema and warmth. TTP with some induration. Area of ruptured abscess.  L foot warmth   Nursing note and vitals reviewed.        CRANIAL NERVES     CN III, IV, VI   Pupils are equal, round, and reactive to light.  Extraocular motions are normal.        Significant Labs: All pertinent labs within the past 24 hours have been reviewed.    Significant Imaging: I have reviewed all pertinent imaging results/findings within the past 24 hours.    Assessment/Plan:     Cellulitis of right lower extremity    Patient with area of ruptured abscess/cellulitis of the R lower leg. No fluctuance of the wound and it is not draining. Patient has TTP around the site. He also has some pain/warmth/erythema of his left lower extremity. Leukocytosis.   -IV Vancomycin   -IV Cipro  Abx given concern for possible abscess with history of severe PCN allergy.   Consider I and D if no significant improvement or developing fluctuance.             VTE Risk Mitigation         Ordered     enoxaparin injection 40 mg  Daily     Route:  Subcutaneous        02/13/18 0040     Medium Risk of VTE  Once      02/13/18 0040             Maurice Tan MD  Department of Hospital Medicine   [facility]

## 2018-02-13 NOTE — HPI
"47 year old with no significant PMHx presents to [facility] with R lower leg cellulitis and wound. Patient states that he works as a  and has been having R lower leg pain for several days. He "popped" a pimple/abscess on his leg yesterday. He is usually on his feet most of the day but feels both of his feet has felt swollen and heavy. R lower extremity with small open sore and large area of surrounding cellulitis. Denies SOB, chest pain, pain on walking, nausea or vomiting.   No trauma to leg.   He also reports swelling and warmth of his left foot.   Denies IV drug use.   He does not taking any medications.   "

## 2018-02-13 NOTE — ED TRIAGE NOTES
"Pt arrives for evaluation of redness to open area on RLE - states it was a "pimple" that he broke open 3 days ago and it is now red and hot with streaks of redness travelling up leg - no drainage noted - swelling to foot and calf or right leg - states entire right leg now is painful with pain starting in left foot today    "

## 2018-02-13 NOTE — SUBJECTIVE & OBJECTIVE
History reviewed. No pertinent past medical history.    Past Surgical History:   Procedure Laterality Date    CHOLECYSTECTOMY         Review of patient's allergies indicates:   Allergen Reactions    Pcn [penicillins] Anaphylaxis       No current facility-administered medications on file prior to encounter.      No current outpatient prescriptions on file prior to encounter.     Family History     None        Social History Main Topics    Smoking status: Never Smoker    Smokeless tobacco: Never Used    Alcohol use No    Drug use: No    Sexual activity: Not on file     Review of Systems   Constitutional: Positive for fatigue. Negative for activity change and appetite change.   HENT: Negative for congestion and sore throat.    Eyes: Negative for discharge and itching.   Respiratory: Negative for apnea and chest tightness.    Cardiovascular: Negative for chest pain.   Gastrointestinal: Negative for abdominal distention and abdominal pain.   Endocrine: Negative for cold intolerance and heat intolerance.   Genitourinary: Negative for difficulty urinating and dysuria.   Musculoskeletal: Negative for arthralgias and myalgias.   Skin: Positive for color change, rash and wound.   Neurological: Negative for dizziness and facial asymmetry.   Hematological: Negative for adenopathy.   Psychiatric/Behavioral: Negative for agitation and behavioral problems.     Objective:     Vital Signs (Most Recent):  Temp: 98.8 °F (37.1 °C) (02/12/18 2220)  Pulse: 80 (02/12/18 2220)  Resp: 18 (02/12/18 2220)  BP: 113/60 (02/12/18 2220)  SpO2: 97 % (02/12/18 2220) Vital Signs (24h Range):  Temp:  [98.1 °F (36.7 °C)-98.8 °F (37.1 °C)] 98.8 °F (37.1 °C)  Pulse:  [80-83] 80  Resp:  [18-20] 18  SpO2:  [97 %-99 %] 97 %  BP: (113-135)/(60-76) 113/60     Weight: 108.9 kg (240 lb)  Body mass index is 33.47 kg/m².    Physical Exam   Constitutional: He is oriented to person, place, and time. He appears well-developed and well-nourished. No distress.    HENT:   Head: Normocephalic and atraumatic.   Eyes: EOM are normal. Pupils are equal, round, and reactive to light.   Neck: Normal range of motion. Neck supple.   Cardiovascular: Normal rate and regular rhythm.    Pulmonary/Chest: Effort normal and breath sounds normal. He has no wheezes. He has no rales.   Abdominal: Soft. Bowel sounds are normal. He exhibits no distension. There is no tenderness.   Musculoskeletal: Normal range of motion. He exhibits edema.   Neurological: He is alert and oriented to person, place, and time.   Skin: Skin is warm and dry. Rash noted. He is not diaphoretic.   R leg 5-7 cm area of erythema and warmth. TTP with some induration. Area of ruptured abscess.  L foot warmth   Nursing note and vitals reviewed.        CRANIAL NERVES     CN III, IV, VI   Pupils are equal, round, and reactive to light.  Extraocular motions are normal.        Significant Labs: All pertinent labs within the past 24 hours have been reviewed.    Significant Imaging: I have reviewed all pertinent imaging results/findings within the past 24 hours.

## 2018-02-13 NOTE — ED TRIAGE NOTES
"Pt presents to the ED c/o right leg pain x 4 days. Pt states, "It started as a pimple, and I popped it." Redness, warmth, and swelling noted to right ankle. Pt reports the pain radiates up the leg. Denies fever. +chills  "

## 2018-02-14 LAB
ANION GAP SERPL CALC-SCNC: 9 MMOL/L
BASOPHILS # BLD AUTO: 0.05 K/UL
BASOPHILS NFR BLD: 0.6 %
BUN SERPL-MCNC: 13 MG/DL
CALCIUM SERPL-MCNC: 8.8 MG/DL
CHLORIDE SERPL-SCNC: 109 MMOL/L
CO2 SERPL-SCNC: 25 MMOL/L
CREAT SERPL-MCNC: 1 MG/DL
DIFFERENTIAL METHOD: ABNORMAL
EOSINOPHIL # BLD AUTO: 0.3 K/UL
EOSINOPHIL NFR BLD: 3.3 %
ERYTHROCYTE [DISTWIDTH] IN BLOOD BY AUTOMATED COUNT: 12.5 %
EST. GFR  (AFRICAN AMERICAN): >60 ML/MIN/1.73 M^2
EST. GFR  (NON AFRICAN AMERICAN): >60 ML/MIN/1.73 M^2
ESTIMATED AVG GLUCOSE: 105 MG/DL
GLUCOSE SERPL-MCNC: 103 MG/DL
HBA1C MFR BLD HPLC: 5.3 %
HCT VFR BLD AUTO: 37.6 %
HGB BLD-MCNC: 12.8 G/DL
IMM GRANULOCYTES # BLD AUTO: 0.29 K/UL
IMM GRANULOCYTES NFR BLD AUTO: 3.4 %
LYMPHOCYTES # BLD AUTO: 3.5 K/UL
LYMPHOCYTES NFR BLD: 41 %
MAGNESIUM SERPL-MCNC: 1.8 MG/DL
MCH RBC QN AUTO: 30.5 PG
MCHC RBC AUTO-ENTMCNC: 34 G/DL
MCV RBC AUTO: 90 FL
MONOCYTES # BLD AUTO: 0.6 K/UL
MONOCYTES NFR BLD: 6.8 %
NEUTROPHILS # BLD AUTO: 3.9 K/UL
NEUTROPHILS NFR BLD: 44.9 %
NRBC BLD-RTO: 0 /100 WBC
PHOSPHATE SERPL-MCNC: 3.4 MG/DL
PLATELET # BLD AUTO: 243 K/UL
PMV BLD AUTO: 11 FL
POTASSIUM SERPL-SCNC: 3.6 MMOL/L
RBC # BLD AUTO: 4.2 M/UL
SODIUM SERPL-SCNC: 143 MMOL/L
VANCOMYCIN TROUGH SERPL-MCNC: 13 UG/ML
WBC # BLD AUTO: 8.59 K/UL

## 2018-02-14 PROCEDURE — 12000002 HC ACUTE/MED SURGE SEMI-PRIVATE ROOM

## 2018-02-14 PROCEDURE — 80048 BASIC METABOLIC PNL TOTAL CA: CPT

## 2018-02-14 PROCEDURE — 83036 HEMOGLOBIN GLYCOSYLATED A1C: CPT

## 2018-02-14 PROCEDURE — 85025 COMPLETE CBC W/AUTO DIFF WBC: CPT

## 2018-02-14 PROCEDURE — 36415 COLL VENOUS BLD VENIPUNCTURE: CPT

## 2018-02-14 PROCEDURE — 83735 ASSAY OF MAGNESIUM: CPT

## 2018-02-14 PROCEDURE — 84100 ASSAY OF PHOSPHORUS: CPT

## 2018-02-14 PROCEDURE — 99233 SBSQ HOSP IP/OBS HIGH 50: CPT | Mod: ,,, | Performed by: HOSPITALIST

## 2018-02-14 PROCEDURE — 80202 ASSAY OF VANCOMYCIN: CPT

## 2018-02-14 PROCEDURE — 63600175 PHARM REV CODE 636 W HCPCS: Performed by: INTERNAL MEDICINE

## 2018-02-14 PROCEDURE — 25000003 PHARM REV CODE 250: Performed by: INTERNAL MEDICINE

## 2018-02-14 RX ADMIN — VANCOMYCIN HYDROCHLORIDE 1750 MG: 5 INJECTION, POWDER, LYOPHILIZED, FOR SOLUTION INTRAVENOUS at 04:02

## 2018-02-14 RX ADMIN — VANCOMYCIN HYDROCHLORIDE 1750 MG: 5 INJECTION, POWDER, LYOPHILIZED, FOR SOLUTION INTRAVENOUS at 03:02

## 2018-02-14 RX ADMIN — ENOXAPARIN SODIUM 40 MG: 100 INJECTION SUBCUTANEOUS at 06:02

## 2018-02-14 RX ADMIN — CIPROFLOXACIN 400 MG: 2 INJECTION, SOLUTION INTRAVENOUS at 07:02

## 2018-02-14 NOTE — PLAN OF CARE
Problem: Patient Care Overview  Goal: Plan of Care Review  Outcome: Ongoing (interventions implemented as appropriate)   02/14/18 6635   Coping/Psychosocial   Plan Of Care Reviewed With patient

## 2018-02-14 NOTE — PLAN OF CARE
Denies having a primary care    Payor: /   Message sent to admitting to follow up on insurance as patient states recently obtained [facility]       02/14/18 7746   Discharge Assessment   Assessment Type Discharge Planning Assessment   Confirmed/corrected address and phone number on facesheet? Yes   Assessment information obtained from? Patient   Expected Length of Stay (days) 3   Prior to hospitilization cognitive status: Alert/Oriented   Prior to hospitalization functional status: Independent   Current cognitive status: Alert/Oriented   Current Functional Status: Independent   Able to Return to Prior Arrangements yes   Is patient able to care for self after discharge? Yes   Patient's perception of discharge disposition home or selfcare   Patient currently receives any other outside agency services? No   Equipment Currently Used at Home none   Does the patient have transportation home? Yes   Transportation Available car   Discharge Plan A Home with family   Discharge Plan B Home with family   Patient/Family In Agreement With Plan yes

## 2018-02-14 NOTE — PROGRESS NOTES
Patient admitted early this morning for RLE wound and cellulitis.  Not much improvement as per patient.  Will continue current antibiotics and will get wound care to evaluate his wound in the AM. Pain is well controlled.

## 2018-02-14 NOTE — PROGRESS NOTES
Wound care consulted for right LE wound.  The area is marked and redness is noted to be decreased.  Wound is closed, scabbed over, cleaned with saline.  The lateral leg is pink.  Patient complains of soreness- no pain to lower leg.  Recommend apply Sween 24 lotion to skin to moisturize. Plan of care discussed with patient who verbalized understanding.     02/14/18 1025       Wound 02/13/18 2244 Other lower Leg   Date First Assessed/Time First Assessed: 02/13/18 2244   Pre-existing: Yes  Wound Type: Other  Side: Right  Orientation: lower  Location: Leg   Wound Image    Wound WDL ex   Dressing Appearance Open to air;No dressing   Drainage Amount None   Appearance Closed/resurfaced;Dry   Periwound Area Redness  (blanches well)   Wound Edges Rolled/closed   Wound Length (cm) 0.5   Wound Width (cm) 0.5   Care Applied:;Moisturizing agent

## 2018-02-15 VITALS
TEMPERATURE: 98 F | HEIGHT: 71 IN | BODY MASS INDEX: 32.5 KG/M2 | SYSTOLIC BLOOD PRESSURE: 116 MMHG | OXYGEN SATURATION: 99 % | RESPIRATION RATE: 16 BRPM | DIASTOLIC BLOOD PRESSURE: 82 MMHG | WEIGHT: 232.13 LBS | HEART RATE: 57 BPM

## 2018-02-15 LAB
ANION GAP SERPL CALC-SCNC: 7 MMOL/L
BASOPHILS # BLD AUTO: 0.05 K/UL
BASOPHILS NFR BLD: 0.6 %
BUN SERPL-MCNC: 14 MG/DL
CALCIUM SERPL-MCNC: 9.2 MG/DL
CHLORIDE SERPL-SCNC: 106 MMOL/L
CO2 SERPL-SCNC: 29 MMOL/L
CREAT SERPL-MCNC: 1 MG/DL
DIFFERENTIAL METHOD: ABNORMAL
EOSINOPHIL # BLD AUTO: 0.3 K/UL
EOSINOPHIL NFR BLD: 3.3 %
ERYTHROCYTE [DISTWIDTH] IN BLOOD BY AUTOMATED COUNT: 12.2 %
EST. GFR  (AFRICAN AMERICAN): >60 ML/MIN/1.73 M^2
EST. GFR  (NON AFRICAN AMERICAN): >60 ML/MIN/1.73 M^2
GLUCOSE SERPL-MCNC: 95 MG/DL
HCT VFR BLD AUTO: 39.9 %
HGB BLD-MCNC: 13.5 G/DL
IMM GRANULOCYTES # BLD AUTO: 0.21 K/UL
IMM GRANULOCYTES NFR BLD AUTO: 2.4 %
LYMPHOCYTES # BLD AUTO: 3.2 K/UL
LYMPHOCYTES NFR BLD: 37.7 %
MAGNESIUM SERPL-MCNC: 2 MG/DL
MCH RBC QN AUTO: 30.4 PG
MCHC RBC AUTO-ENTMCNC: 33.8 G/DL
MCV RBC AUTO: 90 FL
MONOCYTES # BLD AUTO: 0.6 K/UL
MONOCYTES NFR BLD: 7.1 %
NEUTROPHILS # BLD AUTO: 4.2 K/UL
NEUTROPHILS NFR BLD: 48.9 %
NRBC BLD-RTO: 0 /100 WBC
PHOSPHATE SERPL-MCNC: 4.4 MG/DL
PLATELET # BLD AUTO: 274 K/UL
PMV BLD AUTO: 10.4 FL
POTASSIUM SERPL-SCNC: 4.1 MMOL/L
RBC # BLD AUTO: 4.44 M/UL
SODIUM SERPL-SCNC: 142 MMOL/L
WBC # BLD AUTO: 8.6 K/UL

## 2018-02-15 PROCEDURE — 85025 COMPLETE CBC W/AUTO DIFF WBC: CPT

## 2018-02-15 PROCEDURE — 36415 COLL VENOUS BLD VENIPUNCTURE: CPT

## 2018-02-15 PROCEDURE — 99233 SBSQ HOSP IP/OBS HIGH 50: CPT | Mod: ,,, | Performed by: HOSPITALIST

## 2018-02-15 PROCEDURE — 80048 BASIC METABOLIC PNL TOTAL CA: CPT

## 2018-02-15 PROCEDURE — 63600175 PHARM REV CODE 636 W HCPCS: Performed by: INTERNAL MEDICINE

## 2018-02-15 PROCEDURE — 25000003 PHARM REV CODE 250: Performed by: INTERNAL MEDICINE

## 2018-02-15 PROCEDURE — 83735 ASSAY OF MAGNESIUM: CPT

## 2018-02-15 PROCEDURE — 84100 ASSAY OF PHOSPHORUS: CPT

## 2018-02-15 RX ORDER — DOXYCYCLINE HYCLATE 100 MG
100 TABLET ORAL 2 TIMES DAILY
Qty: 15 TABLET | Refills: 0 | Status: SHIPPED | OUTPATIENT
Start: 2018-02-15 | End: 2020-03-08

## 2018-02-15 RX ADMIN — VANCOMYCIN HYDROCHLORIDE 1750 MG: 5 INJECTION, POWDER, LYOPHILIZED, FOR SOLUTION INTRAVENOUS at 04:02

## 2018-02-15 NOTE — PLAN OF CARE
02/15/18 1124   Final Note   Assessment Type Final Discharge Note   Discharge Disposition Home     Patient is discharged to home today with no needs. Patient had already discharged before Cm could speak with the patient. Cm called patient to discuss making a new PCP appt for hospital follow up with in two weeks but patient did not answer. Cm left a voicemail stating that Cm would help make him a PCP appt and left contact information for patient to call if he wanted assistance.

## 2018-02-15 NOTE — PLAN OF CARE
Problem: Fall Risk (Adult)  Goal: Identify Related Risk Factors and Signs and Symptoms  Related risk factors and signs and symptoms are identified upon initiation of Human Response Clinical Practice Guideline (CPG)   Outcome: Ongoing (interventions implemented as appropriate)   02/14/18 1951   Fall Risk   Related Risk Factors (Fall Risk) culprit medication(s);fatigue/slow reaction;history of falls;inadequate lighting   Signs and Symptoms (Fall Risk) presence of risk factors

## 2018-02-15 NOTE — PROGRESS NOTES
"[facility] Hospital Medicine  Progress Note    Patient Name: Kong Lamb  MRN: [id number]  Patient Class: IP- Inpatient   Admission Date: 2/12/2018  Length of Stay: 2 days  Attending Physician: Angel Scanlon MD  Primary Care Provider: Primary Doctor [facility] Medicine Team: [facility] X Angel Scanlon MD    Subjective:     Principal Problem:Cellulitis of right lower extremity    HPI:  47 year old with no significant PMHx presents to [facility] ER with R lower leg cellulitis and wound. Patient states that he works as a  and has been having R lower leg pain for several days. He "popped" a pimple/abscess on his leg yesterday. He is usually on his feet most of the day but feels both of his feet has felt swollen and heavy. R lower extremity with small open sore and large area of surrounding cellulitis. Denies SOB, chest pain, pain on walking, nausea or vomiting.   No trauma to leg.   He also reports swelling and warmth of his left foot.   Denies IV drug use.   He does not taking any medications.     Hospital Course:  No notes on file    Interval History: patient's RLE cellulitis is improving; appreciate wound care consulted; WBC has improved; another day of IV abx and will d/c tomorrow with PO abx to cover for ten days; likely bactrim    Review of Systems   Constitutional: Negative for activity change and appetite change.   HENT: Negative for drooling and facial swelling.    Eyes: Negative for discharge and itching.   Respiratory: Negative for cough, shortness of breath and wheezing.    Cardiovascular: Negative for chest pain and palpitations.   Gastrointestinal: Negative for abdominal pain and nausea.   Genitourinary: Negative for difficulty urinating and dysuria.   Skin: Negative for color change and pallor.   Neurological: Negative for dizziness and numbness.   Psychiatric/Behavioral: Negative for behavioral problems and confusion.     Objective:     Vital Signs (Most Recent):  Temp: 98 °F " (36.7 °C) (02/14/18 1621)  Pulse: 69 (02/14/18 1621)  Resp: 19 (02/14/18 1621)  BP: (!) 143/81 (02/14/18 1621)  SpO2: 98 % (02/14/18 1621) Vital Signs (24h Range):  Temp:  [97.9 °F (36.6 °C)-98.2 °F (36.8 °C)] 98 °F (36.7 °C)  Pulse:  [62-70] 69  Resp:  [16-19] 19  SpO2:  [94 %-98 %] 98 %  BP: (116-143)/(55-86) 143/81     Weight: 105.3 kg (232 lb 1.6 oz)  Body mass index is 32.37 kg/m².    Intake/Output Summary (Last 24 hours) at 02/14/18 2210  Last data filed at 02/14/18 1900   Gross per 24 hour   Intake             1290 ml   Output                0 ml   Net             1290 ml      Physical Exam   Constitutional: He is oriented to person, place, and time. He appears well-developed and well-nourished.   HENT:   Head: Normocephalic and atraumatic.   Eyes: Conjunctivae are normal. Pupils are equal, round, and reactive to light.   Neck: Normal range of motion. No thyromegaly present.   Cardiovascular: Normal rate, regular rhythm and normal heart sounds.    Pulmonary/Chest: Effort normal and breath sounds normal.   Abdominal: Soft. He exhibits no distension. There is no tenderness.   Neurological: He is alert and oriented to person, place, and time. Coordination normal.   Skin: No rash noted. There is erythema.   RLE erythema, improving        Significant Labs:   BMP:   Recent Labs  Lab 02/14/18  0453         K 3.6      CO2 25   BUN 13   CREATININE 1.0   CALCIUM 8.8   MG 1.8     CBC:   Recent Labs  Lab 02/14/18  0453   WBC 8.59   HGB 12.8*   HCT 37.6*          Significant Imaging: I have reviewed all pertinent imaging results/findings within the past 24 hours.    Assessment/Plan:      * Cellulitis of right lower extremity    - due to suspected MRSA  Patient with area of ruptured abscess/cellulitis of the R lower leg. No fluctuance of the wound and it is not draining. Patient has TTP around the site. He also has some pain/warmth/erythema of his left lower extremity. Leukocytosis.   -IV  Vancomycin   Abx given concern for possible abscess with history of severe PCN allergy.   - improving and will likely send out on PO bactrim tomorrow for a total of ten days            VTE Risk Mitigation         Ordered     enoxaparin injection 40 mg  Daily     Route:  Subcutaneous        02/13/18 0040     Medium Risk of VTE  Once      02/13/18 0040              Angel Scanlon MD  Department of Hospital Medicine   [facility]

## 2018-02-15 NOTE — NURSING
IV d/c cath intact, site WDL, gauze and bandage applied.  Reviewed d/c rx and follow up with pt.  Teach back satisfactory.  Given copies of discharge instructions.  Awaiting transport home at this time

## 2018-02-15 NOTE — PLAN OF CARE
02/15/2018      Kong Lamb  [address] [address]          Hospital Medicine Dept.  [facility] [address] [id number] [phone] after hours  [phone] fax Kong Lamb has been hospitalized at the [facility] since 2/12/2018.  Please excuse the patient from duties.  Patient may return on 2/19/18 .  No restrictions.     Please contact me if you have any questions.                  __________________________  Angel Scanlon MD  02/15/2018

## 2018-02-15 NOTE — PLAN OF CARE
Problem: Patient Care Overview  Goal: Plan of Care Review  Outcome: Ongoing (interventions implemented as appropriate)  Reviewed plan of care with patient - Patient with possible discharge today to home with oral abx therapy. Patient with no acute events overnight, see flowsheets for detailed assessment information, will continue to monitor.

## 2018-02-15 NOTE — ASSESSMENT & PLAN NOTE
- due to suspected MRSA  Patient with area of ruptured abscess/cellulitis of the R lower leg. No fluctuance of the wound and it is not draining. Patient has TTP around the site. He also has some pain/warmth/erythema of his left lower extremity. Leukocytosis.   -IV Vancomycin   Abx given concern for possible abscess with history of severe PCN allergy.   - improving and will likely send out on PO bactrim tomorrow for a total of ten days

## 2018-02-15 NOTE — PLAN OF CARE
Problem: Patient Care Overview  Goal: Plan of Care Review  Outcome: Ongoing (interventions implemented as appropriate)   02/14/18 6454   Coping/Psychosocial   Plan Of Care Reviewed With patient

## 2018-02-16 NOTE — ASSESSMENT & PLAN NOTE
- due to suspected MRSA  Patient with area of ruptured abscess/cellulitis of the R lower leg. No fluctuance of the wound and it is not draining. Patient has TTP around the site. He also has some pain/warmth/erythema of his left lower extremity. Leukocytosis.   -IV Vancomycin   Abx given concern for possible abscess with history of severe PCN allergy.   - improving; sending patient home with PO doxycycline for a total of 10 days

## 2018-02-16 NOTE — SUBJECTIVE & OBJECTIVE
Interval History: patient's RLE cellulitis is improving; appreciate wound care consulted; WBC has improved; sending patient home with doxycycline     Review of Systems   Constitutional: Negative for activity change and appetite change.   HENT: Negative for drooling and facial swelling.    Eyes: Negative for discharge and itching.   Respiratory: Negative for cough, shortness of breath and wheezing.    Cardiovascular: Negative for chest pain and palpitations.   Gastrointestinal: Negative for abdominal pain and nausea.   Genitourinary: Negative for difficulty urinating and dysuria.   Skin: Negative for color change and pallor.   Neurological: Negative for dizziness and numbness.   Psychiatric/Behavioral: Negative for behavioral problems and confusion.     Objective:     Vital Signs (Most Recent):  Temp: 97.8 °F (36.6 °C) (02/15/18 0757)  Pulse: (!) 57 (02/15/18 0757)  Resp: 16 (02/15/18 0757)  BP: 116/82 (02/15/18 0757)  SpO2: 99 % (02/15/18 0757) Vital Signs (24h Range):        Weight: 105.3 kg (232 lb 1.6 oz)  Body mass index is 32.37 kg/m².  No intake or output data in the 24 hours ending 02/16/18 1644   Physical Exam   Constitutional: He is oriented to person, place, and time. He appears well-developed and well-nourished.   HENT:   Head: Normocephalic and atraumatic.   Eyes: Conjunctivae are normal. Pupils are equal, round, and reactive to light.   Neck: Normal range of motion. No thyromegaly present.   Cardiovascular: Normal rate, regular rhythm and normal heart sounds.    Pulmonary/Chest: Effort normal and breath sounds normal.   Abdominal: Soft. He exhibits no distension. There is no tenderness.   Neurological: He is alert and oriented to person, place, and time. Coordination normal.   Skin: No rash noted. There is erythema.   RLE erythema, improving        Significant Labs:   BMP:     Recent Labs  Lab 02/15/18  0446   GLU 95      K 4.1      CO2 29   BUN 14   CREATININE 1.0   CALCIUM 9.2   MG 2.0      CBC:     Recent Labs  Lab 02/15/18  0446   WBC 8.60   HGB 13.5*   HCT 39.9*          Significant Imaging: I have reviewed all pertinent imaging results/findings within the past 24 hours.

## 2018-02-16 NOTE — PROGRESS NOTES
"[facility] Hospital Medicine  Progress Note    Patient Name: Kong Lamb  MRN: [id number]  Patient Class: IP- Inpatient   Admission Date: 2/12/2018  Length of Stay: 3 days  Attending Physician: No att. providers found  Primary Care Provider: Primary Doctor No    [facility] Medicine Team: [facility] HOSP MED X Angel Scanlon MD    Subjective:     Principal Problem:Cellulitis of right lower extremity    HPI:  47 year old with no significant PMHx presents to [facility] ER with R lower leg cellulitis and wound. Patient states that he works as a  and has been having R lower leg pain for several days. He "popped" a pimple/abscess on his leg yesterday. He is usually on his feet most of the day but feels both of his feet has felt swollen and heavy. R lower extremity with small open sore and large area of surrounding cellulitis. Denies SOB, chest pain, pain on walking, nausea or vomiting.   No trauma to leg.   He also reports swelling and warmth of his left foot.   Denies IV drug use.   He does not taking any medications.     Hospital Course:  No notes on file    Interval History: patient's RLE cellulitis is improving; appreciate wound care consulted; WBC has improved; sending patient home with doxycycline     Review of Systems   Constitutional: Negative for activity change and appetite change.   HENT: Negative for drooling and facial swelling.    Eyes: Negative for discharge and itching.   Respiratory: Negative for cough, shortness of breath and wheezing.    Cardiovascular: Negative for chest pain and palpitations.   Gastrointestinal: Negative for abdominal pain and nausea.   Genitourinary: Negative for difficulty urinating and dysuria.   Skin: Negative for color change and pallor.   Neurological: Negative for dizziness and numbness.   Psychiatric/Behavioral: Negative for behavioral problems and confusion.     Objective:     Vital Signs (Most Recent):  Temp: 97.8 °F (36.6 °C) (02/15/18 0757)  Pulse: (!) 57 (02/15/18 " 0757)  Resp: 16 (02/15/18 0757)  BP: 116/82 (02/15/18 0757)  SpO2: 99 % (02/15/18 0757) Vital Signs (24h Range):        Weight: 105.3 kg (232 lb 1.6 oz)  Body mass index is 32.37 kg/m².  No intake or output data in the 24 hours ending 02/16/18 1644   Physical Exam   Constitutional: He is oriented to person, place, and time. He appears well-developed and well-nourished.   HENT:   Head: Normocephalic and atraumatic.   Eyes: Conjunctivae are normal. Pupils are equal, round, and reactive to light.   Neck: Normal range of motion. No thyromegaly present.   Cardiovascular: Normal rate, regular rhythm and normal heart sounds.    Pulmonary/Chest: Effort normal and breath sounds normal.   Abdominal: Soft. He exhibits no distension. There is no tenderness.   Neurological: He is alert and oriented to person, place, and time. Coordination normal.   Skin: No rash noted. There is erythema.   RLE erythema, improving        Significant Labs:   BMP:     Recent Labs  Lab 02/15/18  0446   GLU 95      K 4.1      CO2 29   BUN 14   CREATININE 1.0   CALCIUM 9.2   MG 2.0     CBC:     Recent Labs  Lab 02/15/18  0446   WBC 8.60   HGB 13.5*   HCT 39.9*          Significant Imaging: I have reviewed all pertinent imaging results/findings within the past 24 hours.    Assessment/Plan:      * Cellulitis of right lower extremity    - due to suspected MRSA  Patient with area of ruptured abscess/cellulitis of the R lower leg. No fluctuance of the wound and it is not draining. Patient has TTP around the site. He also has some pain/warmth/erythema of his left lower extremity. Leukocytosis.   -IV Vancomycin   Abx given concern for possible abscess with history of severe PCN allergy.   - improving; sending patient home with PO doxycycline for a total of 10 days            VTE Risk Mitigation         Ordered     Medium Risk of VTE  Once      02/13/18 0040              Angel Scanlon MD  Department of Hospital Medicine   [facility]  [place]

## 2018-02-16 NOTE — HOSPITAL COURSE
* Cellulitis of right lower extremity     - due to suspected MRSA  Patient with area of ruptured abscess/cellulitis of the R lower leg. No fluctuance of the wound and it is not draining. Patient has TTP around the site. He also has some pain/warmth/erythema of his left lower extremity. Leukocytosis.   -IV Vancomycin   Abx given concern for possible abscess with history of severe PCN allergy.   - improving; sending patient home with PO doxycycline for a total of 10 days

## 2018-02-16 NOTE — DISCHARGE SUMMARY
"[facility] Hospital Medicine  Discharge Summary      Patient Name: Kong Lamb  MRN: [id number]  Admission Date: 2/12/2018  Hospital Length of Stay: 3 days  Discharge Date and Time: 2/15/18  Attending Physician: Hui att. providers found   Discharging Provider: Angel Scanlon MD  Primary Care Provider: Primary Doctor No  [facility] Medicine Team: [facility] HOSP MED X Angel Scanlon MD    HPI:   47 year old with no significant PMHx presents to [facility] ER with R lower leg cellulitis and wound. Patient states that he works as a  and has been having R lower leg pain for several days. He "popped" a pimple/abscess on his leg yesterday. He is usually on his feet most of the day but feels both of his feet has felt swollen and heavy. R lower extremity with small open sore and large area of surrounding cellulitis. Denies SOB, chest pain, pain on walking, nausea or vomiting.   No trauma to leg.   He also reports swelling and warmth of his left foot.   Denies IV drug use.   He does not taking any medications.     * No surgery found *      Hospital Course:   * Cellulitis of right lower extremity     - due to suspected MRSA  Patient with area of ruptured abscess/cellulitis of the R lower leg. No fluctuance of the wound and it is not draining. Patient has TTP around the site. He also has some pain/warmth/erythema of his left lower extremity. Leukocytosis.   -IV Vancomycin   Abx given concern for possible abscess with history of severe PCN allergy.   - improving; sending patient home with PO doxycycline for a total of 10 days               Consults:     No new Assessment & Plan notes have been filed under this hospital service since the last note was generated.  Service: Hospital Medicine    Final Active Diagnoses:    Diagnosis Date Noted POA    PRINCIPAL PROBLEM:  Cellulitis of right lower extremity [L03.115] 02/12/2018 Yes      Problems Resolved During this Admission:    Diagnosis Date Noted Date Resolved POA "       Discharged Condition: good    Disposition: Home or Self Care    Follow Up:  Follow-up Information      [facility].    Contact information:  [address] [facility] [address] [phone]                 Patient Instructions:   No discharge procedures on file.        Pending Diagnostic Studies:     None         Medications:  Reconciled Home Medications:   Discharge Medication List as of 2/15/2018 10:39 AM      START taking these medications    Details   doxycycline (VIBRA-TABS) 100 MG tablet Take 1 tablet (100 mg total) by mouth 2 (two) times daily., Starting Thu 2/15/2018, Print             Indwelling Lines/Drains at time of discharge:   Lines/Drains/Airways          No matching active lines, drains, or airways          Time spent on the discharge of patient: 33 minutes  Patient was seen and examined on the date of discharge and determined to be suitable for discharge.         Angel Scanlon MD  Department of Hospital Medicine  [facility]

## 2018-02-17 LAB
BACTERIA BLD CULT: NORMAL
BACTERIA BLD CULT: NORMAL

## 2019-10-29 NOTE — SUBJECTIVE & OBJECTIVE
Interval History: patient's RLE cellulitis is improving; appreciate wound care consulted; WBC has improved; another day of IV abx and will d/c tomorrow with PO abx to cover for ten days; likely bactrim    Review of Systems   Constitutional: Negative for activity change and appetite change.   HENT: Negative for drooling and facial swelling.    Eyes: Negative for discharge and itching.   Respiratory: Negative for cough, shortness of breath and wheezing.    Cardiovascular: Negative for chest pain and palpitations.   Gastrointestinal: Negative for abdominal pain and nausea.   Genitourinary: Negative for difficulty urinating and dysuria.   Skin: Negative for color change and pallor.   Neurological: Negative for dizziness and numbness.   Psychiatric/Behavioral: Negative for behavioral problems and confusion.     Objective:     Vital Signs (Most Recent):  Temp: 98 °F (36.7 °C) (02/14/18 1621)  Pulse: 69 (02/14/18 1621)  Resp: 19 (02/14/18 1621)  BP: (!) 143/81 (02/14/18 1621)  SpO2: 98 % (02/14/18 1621) Vital Signs (24h Range):  Temp:  [97.9 °F (36.6 °C)-98.2 °F (36.8 °C)] 98 °F (36.7 °C)  Pulse:  [62-70] 69  Resp:  [16-19] 19  SpO2:  [94 %-98 %] 98 %  BP: (116-143)/(55-86) 143/81     Weight: 105.3 kg (232 lb 1.6 oz)  Body mass index is 32.37 kg/m².    Intake/Output Summary (Last 24 hours) at 02/14/18 2210  Last data filed at 02/14/18 1900   Gross per 24 hour   Intake             1290 ml   Output                0 ml   Net             1290 ml      Physical Exam   Constitutional: He is oriented to person, place, and time. He appears well-developed and well-nourished.   HENT:   Head: Normocephalic and atraumatic.   Eyes: Conjunctivae are normal. Pupils are equal, round, and reactive to light.   Neck: Normal range of motion. No thyromegaly present.   Cardiovascular: Normal rate, regular rhythm and normal heart sounds.    Pulmonary/Chest: Effort normal and breath sounds normal.   Abdominal: Soft. He exhibits no distension.  There is no tenderness.   Neurological: He is alert and oriented to person, place, and time. Coordination normal.   Skin: No rash noted. There is erythema.   RLE erythema, improving        Significant Labs:   BMP:   Recent Labs  Lab 02/14/18  0453         K 3.6      CO2 25   BUN 13   CREATININE 1.0   CALCIUM 8.8   MG 1.8     CBC:   Recent Labs  Lab 02/14/18  0453   WBC 8.59   HGB 12.8*   HCT 37.6*          Significant Imaging: I have reviewed all pertinent imaging results/findings within the past 24 hours.   [No Acute Distress] : no acute distress [Normal] : normal rate, regular rhythm, normal S1 and S2 and no murmur heard [No Edema] : there was no peripheral edema [Soft] : abdomen soft [Non Tender] : non-tender [Normal Posterior Cervical Nodes] : no posterior cervical lymphadenopathy [Normal Anterior Cervical Nodes] : no anterior cervical lymphadenopathy [Coordination Grossly Intact] : coordination grossly intact [No Focal Deficits] : no focal deficits [Normal Gait] : normal gait [Alert and Oriented x3] : oriented to person, place, and time

## 2020-03-08 ENCOUNTER — HOSPITAL ENCOUNTER (EMERGENCY)
Facility: HOSPITAL | Age: 50
Discharge: HOME | End: 2020-03-08
Attending: EMERGENCY MEDICINE

## 2020-03-08 VITALS
SYSTOLIC BLOOD PRESSURE: 122 MMHG | WEIGHT: 220 LBS | HEART RATE: 76 BPM | DIASTOLIC BLOOD PRESSURE: 68 MMHG | HEIGHT: 71 IN | BODY MASS INDEX: 30.8 KG/M2 | RESPIRATION RATE: 20 BRPM | OXYGEN SATURATION: 97 % | TEMPERATURE: 98 F

## 2020-03-08 DIAGNOSIS — R11.10 EMESIS: ICD-10-CM

## 2020-03-08 DIAGNOSIS — K52.9 GASTROENTERITIS: Primary | ICD-10-CM

## 2020-03-08 PROCEDURE — 93010 EKG 12-LEAD: ICD-10-PCS | Mod: ,,, | Performed by: INTERNAL MEDICINE

## 2020-03-08 PROCEDURE — 99284 EMERGENCY DEPT VISIT MOD MDM: CPT | Mod: 25

## 2020-03-08 PROCEDURE — 96361 HYDRATE IV INFUSION ADD-ON: CPT

## 2020-03-08 PROCEDURE — 63600175 PHARM REV CODE 636 W HCPCS: Performed by: EMERGENCY MEDICINE

## 2020-03-08 PROCEDURE — 99284 EMERGENCY DEPT VISIT MOD MDM: CPT | Mod: ,,, | Performed by: EMERGENCY MEDICINE

## 2020-03-08 PROCEDURE — 93010 ELECTROCARDIOGRAM REPORT: CPT | Mod: ,,, | Performed by: INTERNAL MEDICINE

## 2020-03-08 PROCEDURE — 96374 THER/PROPH/DIAG INJ IV PUSH: CPT

## 2020-03-08 PROCEDURE — 93005 ELECTROCARDIOGRAM TRACING: CPT

## 2020-03-08 PROCEDURE — 99284 PR EMERGENCY DEPT VISIT,LEVEL IV: ICD-10-PCS | Mod: ,,, | Performed by: EMERGENCY MEDICINE

## 2020-03-08 PROCEDURE — 93005 ELECTROCARDIOGRAM TRACING: CPT | Performed by: INTERNAL MEDICINE

## 2020-03-08 RX ORDER — ONDANSETRON 4 MG/1
4 TABLET, ORALLY DISINTEGRATING ORAL EVERY 6 HOURS PRN
Qty: 12 TABLET | Refills: 0 | Status: SHIPPED | OUTPATIENT
Start: 2020-03-08 | End: 2021-04-15 | Stop reason: CLARIF

## 2020-03-08 RX ORDER — ONDANSETRON 2 MG/ML
4 INJECTION INTRAMUSCULAR; INTRAVENOUS
Status: COMPLETED | OUTPATIENT
Start: 2020-03-08 | End: 2020-03-08

## 2020-03-08 RX ADMIN — ONDANSETRON 4 MG: 2 INJECTION INTRAMUSCULAR; INTRAVENOUS at 02:03

## 2020-03-08 RX ADMIN — SODIUM CHLORIDE 1000 ML: 0.9 INJECTION, SOLUTION INTRAVENOUS at 02:03

## 2020-03-08 NOTE — ED PROVIDER NOTES
"Encounter Date: 3/8/2020    SCRIBE #1 NOTE: I, Radha Bright, am scribing for, and in the presence of,  Dr. Jacobs. I have scribed the entire note.       History     Chief Complaint   Patient presents with    Emesis     started this morning pt diaphoretic     Patient is a 49 year old male with a PMHx including cellulitis and cholecystitis, who presents to the ED with a chief complaint of nausea and vomiting. Patient reports that he woke up this morning and began throwing up-unsure of how many episodes but endorses several. Describes his nausea as the "worst of my life". He also complains of multiple episodes of non-bloody diarrhea, chills, diaphoresis, weakness, pallor, dry mouth, and fatigue. Reports that he and his son went to the races last night, where he had two beers and got [employer] after. Son feels fine. Denies fever, CP, dyspnea, SOB, abdominal pain.    The history is provided by the patient and medical records.     Review of patient's allergies indicates:   Allergen Reactions    Pcn [penicillins] Anaphylaxis     Past Medical History:   Diagnosis Date    Cellulitis      Past Surgical History:   Procedure Laterality Date    CHOLECYSTECTOMY       No family history on file.  Social History     Tobacco Use    Smoking status: Never Smoker    Smokeless tobacco: Never Used   Substance Use Topics    Alcohol use: No    Drug use: No     Review of Systems     General: No fever. Positive chills. Positive diaphoresis. Positive fatigue.  Eyes: No visual changes.  Head: No headache.    Integument: No rashes or lesions.  Chest: No shortness of breath.  Cardiovascular: Positive chest pain.  Abdomen: No abdominal pain. Positive nausea. Positive vomiting. Positive diarrhea.   Urinary: No abnormal urination.  Neurologic: Positive weakness.  No numbness.  Hematologic: No easy bruising.  Endocrine: No excessive thirst or urination.  Skin: Positive pallor.      Physical Exam     Initial Vitals [03/08/20 1312]   BP " Pulse Resp Temp SpO2   130/65 84 16 97.7 °F (36.5 °C) 100 %      MAP       --         Physical Exam    Nursing note and vitals reviewed.      Appearance: Moderate acute distress. Pt rolling around in bed.  HEENT: Normocephalic. Atraumatic. No conjunctival injection. EOMI. PERRL.    Neck: No JVD. Neck supple.    Chest: Non-tender. No respiratory distress. Lungs clear to auscultation bilaterally.   Cardiovascular: Regular rate and rhythm. +2 radial pulses bilaterally.  Abdomen: Non tender abdomen. Not distended   Musculoskeletal: Good range of motion all joints. No deformities.    Neurologic: Alert and oriented x 3.  Equal strength in upper and lower extremities bilaterally. Normal sensation. No facial droop. Normal speech.    Psych:  Appropriate, conversant   Integumentary: No rashes seen.  Good turgor.  No abrasions.  No ecchymoses.    ED Course   Procedures  Labs Reviewed - No data to display       Imaging Results    None          Medical Decision Making:   History:   Old Medical Records: I decided to obtain old medical records.  Clinical Tests:   Medical Tests: Ordered and Reviewed            Scribe Attestation:   Scribe #1: I performed the above scribed service and the documentation accurately describes the services I performed. I attest to the accuracy of the note.                          Clinical Impression:       ICD-10-CM ICD-9-CM   1. Gastroenteritis K52.9 558.9   2. Emesis R11.10 787.03     50 yo male presents to ED for n/v/diarrhea. Exam shows VSS, afeb. Pt visibly uncomfortable from nausea. Abdomen benign. IVF and zofran with resolution. Pt tolerated PO, crackers and AJ    Discussed results, diagnosis, and treatment plan with pt; advised close follow-up with PCP. Reviewed strict return precautions. Pt confirms understanding and ability to comply.          ED Disposition Condition    Discharge Stable        ED Prescriptions     Medication Sig Dispense Start Date End Date Auth. Provider    ondansetron  (ZOFRAN-ODT) 4 MG TbDL Take 1 tablet (4 mg total) by mouth every 6 (six) hours as needed. 12 tablet 3/8/2020  Kim Jacobs MD        Follow-up Information     Follow up With Specialties Details Why Contact Info Additional Information    [facility] Internal Medicine Internal Medicine Schedule an appointment as soon as possible for a visit   [address]  [facility] [address] [phone] [facility] Bld[facility].                                     Kim Jacobs MD  03/08/20 6792

## 2020-03-08 NOTE — PROVIDER PROGRESS NOTES - EMERGENCY DEPT.
Encounter Date: 3/8/2020    ED Physician Progress Notes         EKG - STEMI Decision  Initial Reading: No STEMI present.

## 2020-03-08 NOTE — ED TRIAGE NOTES
Around 6 am, pt started to have n/v with diarrhea, chills, sweats, and chest pain due to vomiting    LOC: The patient is awake, alert, and oriented to place, time, situation. Affect is appropriate.  Speech is appropriate and clear.     APPEARANCE: Patient resting comfortably in no acute distress.  Patient is clean and well groomed.    SKIN: The skin is warm and dry; color consistent with ethnicity.  Patient has normal skin turgor and moist mucus membranes.  Skin intact; no breakdown or bruising noted.     MUSCULOSKELETAL: Patient moving upper and lower extremities without difficulty.  Denies weakness.     RESPIRATORY: Airway is open and patent. Respirations spontaneous, even, easy, and non-labored.  Patient has a normal effort and rate.  No accessory muscle use noted. Denies cough.     CARDIAC:  Normal rhythm and rate noted.  No peripheral edema noted.Chest pain noted.      ABDOMEN: Soft and non tender to palpation.  No distention noted. Nausea and vomiting with diarrhea    NEUROLOGIC: Eyes open spontaneously.  Behavior appropriate to situation.  Follows commands; facial expression symmetrical.  Purposeful motor response noted; normal sensation in all extremities.

## 2020-08-01 NOTE — NURSING
Admitted to the floor at 2339.  Awake. Alert,, and oriented X 4.  No complaints of chest pain or shortness of breath.  Right lower extremity red and swollen  Patient states he popped a small pimple on the right LE and it got infected, mostly from boots rubbing against open sore.  Left foot edematous.  Weight - 231 per standard scale.  No complaints of pain or discomfort.  Vancomycin infusing at the time to saline lock er left antecubital.   Maintain good oral hygiene.

## 2021-01-31 ENCOUNTER — HOSPITAL ENCOUNTER (EMERGENCY)
Facility: HOSPITAL | Age: 51
Discharge: HOME | End: 2021-01-31
Attending: EMERGENCY MEDICINE
Payer: MEDICAID

## 2021-01-31 VITALS
SYSTOLIC BLOOD PRESSURE: 129 MMHG | OXYGEN SATURATION: 98 % | RESPIRATION RATE: 18 BRPM | HEIGHT: 71 IN | HEART RATE: 80 BPM | BODY MASS INDEX: 31.5 KG/M2 | WEIGHT: 225 LBS | TEMPERATURE: 98 F | DIASTOLIC BLOOD PRESSURE: 70 MMHG
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
## 2021-01-31 DIAGNOSIS — S13.4XXA SPRAIN OF LIGAMENTS OF CERVICAL SPINE, INITIAL ENCOUNTER: ICD-10-CM

## 2021-01-31 PROBLEM — S14.109A ACUTE TRAUMATIC INJURY OF CERVICAL SPINE: Status: ACTIVE | Noted: 2021-01-31

## 2021-01-31 PROCEDURE — 99284 EMERGENCY DEPT VISIT MOD MDM: CPT | Mod: ,,, | Performed by: EMERGENCY MEDICINE

## 2021-01-31 PROCEDURE — 99285 EMERGENCY DEPT VISIT HI MDM: CPT | Mod: 25

## 2021-01-31 PROCEDURE — 96361 HYDRATE IV INFUSION ADD-ON: CPT

## 2021-01-31 PROCEDURE — 63600175 PHARM REV CODE 636 W HCPCS: Performed by: EMERGENCY MEDICINE

## 2021-01-31 PROCEDURE — 96376 TX/PRO/DX INJ SAME DRUG ADON: CPT

## 2021-01-31 PROCEDURE — 25000003 PHARM REV CODE 250: Performed by: EMERGENCY MEDICINE

## 2021-01-31 PROCEDURE — 96374 THER/PROPH/DIAG INJ IV PUSH: CPT

## 2021-01-31 PROCEDURE — 99284 PR EMERGENCY DEPT VISIT,LEVEL IV: ICD-10-PCS | Mod: ,,, | Performed by: EMERGENCY MEDICINE

## 2021-01-31 PROCEDURE — 96375 TX/PRO/DX INJ NEW DRUG ADDON: CPT

## 2021-01-31 RX ORDER — IBUPROFEN 600 MG/1
600 TABLET ORAL EVERY 6 HOURS PRN
Qty: 56 TABLET | Refills: 0 | Status: SHIPPED | OUTPATIENT
Start: 2021-01-31 | End: 2021-02-14

## 2021-01-31 RX ORDER — MORPHINE SULFATE 4 MG/ML
4 INJECTION, SOLUTION INTRAMUSCULAR; INTRAVENOUS
Status: COMPLETED | OUTPATIENT
Start: 2021-01-31 | End: 2021-01-31

## 2021-01-31 RX ORDER — HYDROCODONE BITARTRATE AND ACETAMINOPHEN 5; 325 MG/1; MG/1
2 TABLET ORAL
Status: COMPLETED | OUTPATIENT
Start: 2021-01-31 | End: 2021-01-31

## 2021-01-31 RX ORDER — KETOROLAC TROMETHAMINE 30 MG/ML
15 INJECTION, SOLUTION INTRAMUSCULAR; INTRAVENOUS
Status: COMPLETED | OUTPATIENT
Start: 2021-01-31 | End: 2021-01-31

## 2021-01-31 RX ORDER — ONDANSETRON 2 MG/ML
4 INJECTION INTRAMUSCULAR; INTRAVENOUS
Status: COMPLETED | OUTPATIENT
Start: 2021-01-31 | End: 2021-01-31

## 2021-01-31 RX ORDER — METHOCARBAMOL 500 MG/1
1000 TABLET, FILM COATED ORAL 3 TIMES DAILY
Qty: 31 TABLET | Refills: 0 | Status: SHIPPED | OUTPATIENT
Start: 2021-01-31 | End: 2021-02-05

## 2021-01-31 RX ORDER — DIAZEPAM 5 MG/1
5 TABLET ORAL
Status: COMPLETED | OUTPATIENT
Start: 2021-01-31 | End: 2021-01-31

## 2021-01-31 RX ORDER — DIAZEPAM 5 MG/1
5 TABLET ORAL EVERY 6 HOURS PRN
Qty: 20 TABLET | Refills: 0 | Status: ON HOLD | OUTPATIENT
Start: 2021-01-31 | End: 2022-08-03 | Stop reason: HOSPADM

## 2021-01-31 RX ORDER — ORPHENADRINE CITRATE 30 MG/ML
60 INJECTION INTRAMUSCULAR; INTRAVENOUS
Status: COMPLETED | OUTPATIENT
Start: 2021-01-31 | End: 2021-01-31

## 2021-01-31 RX ORDER — HYDROCODONE BITARTRATE AND ACETAMINOPHEN 5; 325 MG/1; MG/1
1 TABLET ORAL EVERY 6 HOURS PRN
Qty: 28 TABLET | Refills: 0 | Status: ON HOLD | OUTPATIENT
Start: 2021-01-31 | End: 2022-08-03 | Stop reason: HOSPADM

## 2021-01-31 RX ADMIN — SODIUM CHLORIDE, SODIUM LACTATE, POTASSIUM CHLORIDE, AND CALCIUM CHLORIDE 1000 ML: .6; .31; .03; .02 INJECTION, SOLUTION INTRAVENOUS at 07:01

## 2021-01-31 RX ADMIN — MORPHINE SULFATE 4 MG: 4 INJECTION INTRAVENOUS at 07:01

## 2021-01-31 RX ADMIN — ONDANSETRON 4 MG: 2 INJECTION INTRAMUSCULAR; INTRAVENOUS at 06:01

## 2021-01-31 RX ADMIN — HYDROCODONE BITARTRATE AND ACETAMINOPHEN 2 TABLET: 5; 325 TABLET ORAL at 09:01

## 2021-01-31 RX ADMIN — ONDANSETRON 4 MG: 2 INJECTION INTRAMUSCULAR; INTRAVENOUS at 07:01

## 2021-01-31 RX ADMIN — ORPHENADRINE CITRATE 60 MG: 60 INJECTION INTRAMUSCULAR; INTRAVENOUS at 06:01

## 2021-01-31 RX ADMIN — KETOROLAC TROMETHAMINE 15 MG: 30 INJECTION, SOLUTION INTRAMUSCULAR at 07:01

## 2021-01-31 RX ADMIN — DIAZEPAM 5 MG: 5 TABLET ORAL at 09:01

## 2021-01-31 RX ADMIN — MORPHINE SULFATE 4 MG: 4 INJECTION INTRAVENOUS at 06:01

## 2021-01-31 RX ADMIN — SODIUM CHLORIDE, SODIUM LACTATE, POTASSIUM CHLORIDE, AND CALCIUM CHLORIDE 1000 ML: .6; .31; .03; .02 INJECTION, SOLUTION INTRAVENOUS at 06:01

## 2021-02-01 ENCOUNTER — TELEPHONE (OUTPATIENT)
Dept: NEUROSURGERY | Facility: CLINIC | Age: 51
End: 2021-02-01

## 2021-02-01 DIAGNOSIS — S14.109A ACUTE TRAUMATIC INJURY OF CERVICAL SPINE: Primary | ICD-10-CM

## 2021-04-15 ENCOUNTER — HOSPITAL ENCOUNTER (EMERGENCY)
Facility: HOSPITAL | Age: 51
Discharge: HOME | End: 2021-04-15
Attending: EMERGENCY MEDICINE
Payer: MEDICAID

## 2021-04-15 VITALS
RESPIRATION RATE: 14 BRPM | HEART RATE: 78 BPM | BODY MASS INDEX: 31.5 KG/M2 | TEMPERATURE: 98 F | DIASTOLIC BLOOD PRESSURE: 84 MMHG | HEIGHT: 71 IN | OXYGEN SATURATION: 98 % | WEIGHT: 225 LBS | SYSTOLIC BLOOD PRESSURE: 153 MMHG

## 2021-04-15 DIAGNOSIS — M79.605 LEFT LEG PAIN: Primary | ICD-10-CM

## 2021-04-15 LAB
BUN SERPL-MCNC: 14 MG/DL (ref 6–30)
CHLORIDE SERPL-SCNC: 101 MMOL/L (ref 95–110)
CREAT SERPL-MCNC: 1.3 MG/DL (ref 0.5–1.4)
GLUCOSE SERPL-MCNC: 94 MG/DL (ref 70–110)
HCT VFR BLD CALC: 52 %PCV (ref 36–54)
MAGNESIUM SERPL-MCNC: 2.3 MG/DL (ref 1.6–2.6)
POC IONIZED CALCIUM: 1.05 MMOL/L (ref 1.06–1.42)
POC TCO2 (MEASURED): 28 MMOL/L (ref 23–29)
POTASSIUM BLD-SCNC: 4.2 MMOL/L (ref 3.5–5.1)
SAMPLE: ABNORMAL
SODIUM BLD-SCNC: 139 MMOL/L (ref 136–145)

## 2021-04-15 PROCEDURE — 99284 PR EMERGENCY DEPT VISIT,LEVEL IV: ICD-10-PCS | Mod: ,,, | Performed by: EMERGENCY MEDICINE

## 2021-04-15 PROCEDURE — 80047 BASIC METABLC PNL IONIZED CA: CPT

## 2021-04-15 PROCEDURE — 99284 EMERGENCY DEPT VISIT MOD MDM: CPT | Mod: 25

## 2021-04-15 PROCEDURE — 83735 ASSAY OF MAGNESIUM: CPT | Performed by: EMERGENCY MEDICINE

## 2021-04-15 PROCEDURE — 99284 EMERGENCY DEPT VISIT MOD MDM: CPT | Mod: ,,, | Performed by: EMERGENCY MEDICINE

## 2022-01-26 ENCOUNTER — HOSPITAL ENCOUNTER (EMERGENCY)
Facility: HOSPITAL | Age: 52
Discharge: HOME | End: 2022-01-26
Attending: EMERGENCY MEDICINE
Payer: MEDICAID

## 2022-01-26 VITALS
HEART RATE: 87 BPM | SYSTOLIC BLOOD PRESSURE: 140 MMHG | TEMPERATURE: 97 F | BODY MASS INDEX: 31.38 KG/M2 | OXYGEN SATURATION: 100 % | RESPIRATION RATE: 18 BRPM | WEIGHT: 225 LBS | DIASTOLIC BLOOD PRESSURE: 82 MMHG

## 2022-01-26 DIAGNOSIS — M54.12 CERVICAL RADICULOPATHY: Primary | ICD-10-CM

## 2022-01-26 LAB
CTP QC/QA: YES
SARS-COV-2 RDRP RESP QL NAA+PROBE: NEGATIVE

## 2022-01-26 PROCEDURE — 99282 PR EMERGENCY DEPT VISIT,LEVEL II: ICD-10-PCS | Mod: CS,,, | Performed by: PHYSICIAN ASSISTANT

## 2022-01-26 PROCEDURE — 99282 EMERGENCY DEPT VISIT SF MDM: CPT | Mod: CS,,, | Performed by: PHYSICIAN ASSISTANT

## 2022-01-26 PROCEDURE — U0002 COVID-19 LAB TEST NON-CDC: HCPCS | Performed by: PHYSICIAN ASSISTANT

## 2022-01-26 PROCEDURE — 99282 EMERGENCY DEPT VISIT SF MDM: CPT | Mod: 25

## 2022-01-26 NOTE — Clinical Note
"Kong "Selam Lamb was seen and treated in our emergency department on 1/26/2022.     COVID-19 is present in our communities across the state. There is limited testing for COVID at this time, so not all patients can be tested. In this situation, your employee meets the following criteria:    [employer] has met the criteria for COVID-19 testing and has a NEGATIVE result. The employee can return to work once they are asymptomatic for 24 hours without the use of fever reducing medications (Tylenol, Motrin, etc).     If the employee is not fully vaccinated and had a close contact:  · Retest at 5 to 7 days post-exposure  · If possible, it is recommended that they quarantine for 5 days from the time of contact regardless of their test status.  · A mask should be worn post quarantine for 5 days.    If you have any questions or concerns, or if I can be of further assistance, please do not hesitate to contact me.    Sincerely,             Corina Presley PA-C"
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
## 2022-01-27 NOTE — DISCHARGE INSTRUCTIONS
Take ibuprofen 600-800 mg every 6 hours as needed with foods for anti-inflammatory relief.  You can take acetaminophen/tylenol 650 mg every 6 hours for added relief.  Apply ice to the area for 10-20 minutes every 4 hours. You can apply heat 2 days after for the same duration and frequency.  Follow up with primary care physician if your symptoms do not improve.   Return to the ER for new or worsening symptoms.  No future appointments.

## 2022-01-27 NOTE — ED NOTES
Provider discussed d/c with pt, including RTED precautions. Upon discharge patient found to be AAOx4, respirations even and unlabored, skin warm and dry, moves all extremities without difficulty. No new complaints or apparent distress upon discharge.

## 2022-08-01 ENCOUNTER — HOSPITAL ENCOUNTER (INPATIENT)
Facility: HOSPITAL | Age: 52
LOS: 2 days | Discharge: HOME | DRG: 558 | End: 2022-08-03
Attending: EMERGENCY MEDICINE | Admitting: FAMILY MEDICINE
Payer: MEDICAID

## 2022-08-01 DIAGNOSIS — R07.9 CHEST PAIN: ICD-10-CM

## 2022-08-01 DIAGNOSIS — M71.121 SEPTIC BURSITIS OF ELBOW, RIGHT: ICD-10-CM

## 2022-08-01 DIAGNOSIS — M71.10 SEPTIC BURSITIS: Primary | ICD-10-CM

## 2022-08-01 DIAGNOSIS — S59.901A ELBOW INJURY, RIGHT, INITIAL ENCOUNTER: ICD-10-CM

## 2022-08-01 DIAGNOSIS — R74.8 ELEVATED CPK: ICD-10-CM

## 2022-08-01 PROBLEM — M60.9 MYOSITIS: Status: ACTIVE | Noted: 2022-08-01

## 2022-08-01 PROBLEM — M70.20 OLECRANON BURSITIS: Status: ACTIVE | Noted: 2022-08-01

## 2022-08-01 LAB
ALBUMIN SERPL BCP-MCNC: 4 G/DL (ref 3.5–5.2)
ALP SERPL-CCNC: 84 U/L (ref 55–135)
ALT SERPL W/O P-5'-P-CCNC: 64 U/L (ref 10–44)
ANION GAP SERPL CALC-SCNC: 11 MMOL/L (ref 8–16)
APPEARANCE FLD: NORMAL
AST SERPL-CCNC: 75 U/L (ref 10–40)
BACTERIA #/AREA URNS AUTO: ABNORMAL /HPF
BASOPHILS # BLD AUTO: 0.04 K/UL (ref 0–0.2)
BASOPHILS NFR BLD: 0.2 % (ref 0–1.9)
BILIRUB SERPL-MCNC: 0.5 MG/DL (ref 0.1–1)
BILIRUB UR QL STRIP: NEGATIVE
BODY FLD TYPE: NORMAL
BUN SERPL-MCNC: 18 MG/DL (ref 6–20)
CALCIUM SERPL-MCNC: 9.3 MG/DL (ref 8.7–10.5)
CHLORIDE SERPL-SCNC: 103 MMOL/L (ref 95–110)
CK SERPL-CCNC: 1535 U/L (ref 20–200)
CLARITY UR REFRACT.AUTO: ABNORMAL
CO2 SERPL-SCNC: 22 MMOL/L (ref 23–29)
COLOR FLD: NORMAL
COLOR UR AUTO: YELLOW
CREAT SERPL-MCNC: 1.1 MG/DL (ref 0.5–1.4)
CRP SERPL-MCNC: 6.5 MG/L (ref 0–8.2)
DIFFERENTIAL METHOD: ABNORMAL
EOSINOPHIL # BLD AUTO: 0.2 K/UL (ref 0–0.5)
EOSINOPHIL NFR BLD: 1 % (ref 0–8)
ERYTHROCYTE [DISTWIDTH] IN BLOOD BY AUTOMATED COUNT: 13.2 % (ref 11.5–14.5)
ERYTHROCYTE [SEDIMENTATION RATE] IN BLOOD BY PHOTOMETRIC METHOD: 15 MM/HR (ref 0–23)
EST. GFR  (NO RACE VARIABLE): >60 ML/MIN/1.73 M^2
GLUCOSE SERPL-MCNC: 115 MG/DL (ref 70–110)
GLUCOSE UR QL STRIP: NEGATIVE
GRAM STN SPEC: NORMAL
GRAM STN SPEC: NORMAL
HCT VFR BLD AUTO: 47.1 % (ref 40–54)
HGB BLD-MCNC: 16.4 G/DL (ref 14–18)
HGB UR QL STRIP: NEGATIVE
HYALINE CASTS UR QL AUTO: 0 /LPF
IMM GRANULOCYTES # BLD AUTO: 0.1 K/UL (ref 0–0.04)
IMM GRANULOCYTES NFR BLD AUTO: 0.6 % (ref 0–0.5)
KETONES UR QL STRIP: NEGATIVE
LEUKOCYTE ESTERASE UR QL STRIP: NEGATIVE
LYMPHOCYTES # BLD AUTO: 2.4 K/UL (ref 1–4.8)
LYMPHOCYTES NFR BLD: 13.7 % (ref 18–48)
LYMPHOCYTES NFR FLD MANUAL: 6 %
MCH RBC QN AUTO: 30.8 PG (ref 27–31)
MCHC RBC AUTO-ENTMCNC: 34.8 G/DL (ref 32–36)
MCV RBC AUTO: 88 FL (ref 82–98)
MICROSCOPIC COMMENT: ABNORMAL
MONOCYTES # BLD AUTO: 1.3 K/UL (ref 0.3–1)
MONOCYTES NFR BLD: 7.2 % (ref 4–15)
MONOS+MACROS NFR FLD MANUAL: 2 %
NEUTROPHILS # BLD AUTO: 13.7 K/UL (ref 1.8–7.7)
NEUTROPHILS NFR BLD: 77.3 % (ref 38–73)
NEUTROPHILS NFR FLD MANUAL: 92 %
NITRITE UR QL STRIP: NEGATIVE
NRBC BLD-RTO: 0 /100 WBC
PH UR STRIP: 5 [PH] (ref 5–8)
PLATELET # BLD AUTO: 255 K/UL (ref 150–450)
PMV BLD AUTO: 10.1 FL (ref 9.2–12.9)
POTASSIUM SERPL-SCNC: 4 MMOL/L (ref 3.5–5.1)
PROT SERPL-MCNC: 7.1 G/DL (ref 6–8.4)
PROT UR QL STRIP: ABNORMAL
RBC # BLD AUTO: 5.33 M/UL (ref 4.6–6.2)
RBC #/AREA URNS AUTO: 9 /HPF (ref 0–4)
SARS-COV-2 RDRP RESP QL NAA+PROBE: NEGATIVE
SODIUM SERPL-SCNC: 136 MMOL/L (ref 136–145)
SP GR UR STRIP: 1.03 (ref 1–1.03)
URN SPEC COLLECT METH UR: ABNORMAL
WBC # BLD AUTO: 17.68 K/UL (ref 3.9–12.7)
WBC # FLD: 5768 /CU MM
WBC #/AREA URNS AUTO: 4 /HPF (ref 0–5)

## 2022-08-01 PROCEDURE — 85025 COMPLETE CBC W/AUTO DIFF WBC: CPT | Performed by: PHYSICIAN ASSISTANT

## 2022-08-01 PROCEDURE — 87070 CULTURE OTHR SPECIMN AEROBIC: CPT

## 2022-08-01 PROCEDURE — 25000003 PHARM REV CODE 250: Performed by: PHYSICIAN ASSISTANT

## 2022-08-01 PROCEDURE — 96361 HYDRATE IV INFUSION ADD-ON: CPT

## 2022-08-01 PROCEDURE — 99285 EMERGENCY DEPT VISIT HI MDM: CPT | Mod: CS,,, | Performed by: PHYSICIAN ASSISTANT

## 2022-08-01 PROCEDURE — 96374 THER/PROPH/DIAG INJ IV PUSH: CPT

## 2022-08-01 PROCEDURE — 87102 FUNGUS ISOLATION CULTURE: CPT

## 2022-08-01 PROCEDURE — 82550 ASSAY OF CK (CPK): CPT | Performed by: PHYSICIAN ASSISTANT

## 2022-08-01 PROCEDURE — 80053 COMPREHEN METABOLIC PANEL: CPT | Performed by: PHYSICIAN ASSISTANT

## 2022-08-01 PROCEDURE — 87389 HIV-1 AG W/HIV-1&-2 AB AG IA: CPT | Performed by: PHYSICIAN ASSISTANT

## 2022-08-01 PROCEDURE — 87116 MYCOBACTERIA CULTURE: CPT

## 2022-08-01 PROCEDURE — 99285 EMERGENCY DEPT VISIT HI MDM: CPT | Mod: 25

## 2022-08-01 PROCEDURE — 63600175 PHARM REV CODE 636 W HCPCS: Performed by: PHYSICIAN ASSISTANT

## 2022-08-01 PROCEDURE — 99223 1ST HOSP IP/OBS HIGH 75: CPT | Mod: ,,, | Performed by: FAMILY MEDICINE

## 2022-08-01 PROCEDURE — 99223 PR INITIAL HOSPITAL CARE,LEVL III: ICD-10-PCS | Mod: ,,, | Performed by: FAMILY MEDICINE

## 2022-08-01 PROCEDURE — 86803 HEPATITIS C AB TEST: CPT | Performed by: PHYSICIAN ASSISTANT

## 2022-08-01 PROCEDURE — 89051 BODY FLUID CELL COUNT: CPT

## 2022-08-01 PROCEDURE — 87075 CULTR BACTERIA EXCEPT BLOOD: CPT

## 2022-08-01 PROCEDURE — U0002 COVID-19 LAB TEST NON-CDC: HCPCS | Performed by: PHYSICIAN ASSISTANT

## 2022-08-01 PROCEDURE — 96375 TX/PRO/DX INJ NEW DRUG ADDON: CPT

## 2022-08-01 PROCEDURE — 86140 C-REACTIVE PROTEIN: CPT | Performed by: PHYSICIAN ASSISTANT

## 2022-08-01 PROCEDURE — 85652 RBC SED RATE AUTOMATED: CPT | Performed by: PHYSICIAN ASSISTANT

## 2022-08-01 PROCEDURE — 87040 BLOOD CULTURE FOR BACTERIA: CPT | Performed by: FAMILY MEDICINE

## 2022-08-01 PROCEDURE — 99285 PR EMERGENCY DEPT VISIT,LEVEL V: ICD-10-PCS | Mod: CS,,, | Performed by: PHYSICIAN ASSISTANT

## 2022-08-01 PROCEDURE — 87205 SMEAR GRAM STAIN: CPT

## 2022-08-01 PROCEDURE — 20600001 HC STEP DOWN PRIVATE ROOM

## 2022-08-01 PROCEDURE — 25000003 PHARM REV CODE 250: Performed by: FAMILY MEDICINE

## 2022-08-01 PROCEDURE — 87206 SMEAR FLUORESCENT/ACID STAI: CPT

## 2022-08-01 PROCEDURE — 81001 URINALYSIS AUTO W/SCOPE: CPT | Performed by: PHYSICIAN ASSISTANT

## 2022-08-01 RX ORDER — ONDANSETRON 2 MG/ML
4 INJECTION INTRAMUSCULAR; INTRAVENOUS
Status: COMPLETED | OUTPATIENT
Start: 2022-08-01 | End: 2022-08-01

## 2022-08-01 RX ORDER — GLUCAGON 1 MG
1 KIT INJECTION
Status: DISCONTINUED | OUTPATIENT
Start: 2022-08-01 | End: 2022-08-03 | Stop reason: HOSPADM

## 2022-08-01 RX ORDER — IPRATROPIUM BROMIDE AND ALBUTEROL SULFATE 2.5; .5 MG/3ML; MG/3ML
3 SOLUTION RESPIRATORY (INHALATION) EVERY 6 HOURS PRN
Status: DISCONTINUED | OUTPATIENT
Start: 2022-08-01 | End: 2022-08-03 | Stop reason: HOSPADM

## 2022-08-01 RX ORDER — IBUPROFEN 200 MG
24 TABLET ORAL
Status: DISCONTINUED | OUTPATIENT
Start: 2022-08-01 | End: 2022-08-03 | Stop reason: HOSPADM

## 2022-08-01 RX ORDER — IBUPROFEN 200 MG
16 TABLET ORAL
Status: DISCONTINUED | OUTPATIENT
Start: 2022-08-01 | End: 2022-08-03 | Stop reason: HOSPADM

## 2022-08-01 RX ORDER — VANCOMYCIN HCL IN 5 % DEXTROSE 1G/250ML
1000 PLASTIC BAG, INJECTION (ML) INTRAVENOUS
Status: DISCONTINUED | OUTPATIENT
Start: 2022-08-01 | End: 2022-08-01

## 2022-08-01 RX ORDER — ACETAMINOPHEN 500 MG
1000 TABLET ORAL EVERY 8 HOURS PRN
Status: DISCONTINUED | OUTPATIENT
Start: 2022-08-01 | End: 2022-08-03 | Stop reason: HOSPADM

## 2022-08-01 RX ORDER — SODIUM CHLORIDE 0.9 % (FLUSH) 0.9 %
10 SYRINGE (ML) INJECTION EVERY 12 HOURS PRN
Status: DISCONTINUED | OUTPATIENT
Start: 2022-08-01 | End: 2022-08-03 | Stop reason: HOSPADM

## 2022-08-01 RX ORDER — ONDANSETRON 2 MG/ML
4 INJECTION INTRAMUSCULAR; INTRAVENOUS EVERY 8 HOURS PRN
Status: DISCONTINUED | OUTPATIENT
Start: 2022-08-01 | End: 2022-08-03 | Stop reason: HOSPADM

## 2022-08-01 RX ORDER — AMOXICILLIN 250 MG
1 CAPSULE ORAL 2 TIMES DAILY PRN
Status: DISCONTINUED | OUTPATIENT
Start: 2022-08-01 | End: 2022-08-03 | Stop reason: HOSPADM

## 2022-08-01 RX ORDER — TALC
6 POWDER (GRAM) TOPICAL NIGHTLY PRN
Status: DISCONTINUED | OUTPATIENT
Start: 2022-08-01 | End: 2022-08-03 | Stop reason: HOSPADM

## 2022-08-01 RX ORDER — OXYCODONE HYDROCHLORIDE 5 MG/1
5 TABLET ORAL EVERY 4 HOURS PRN
Status: DISCONTINUED | OUTPATIENT
Start: 2022-08-01 | End: 2022-08-02

## 2022-08-01 RX ORDER — MAG HYDROX/ALUMINUM HYD/SIMETH 200-200-20
30 SUSPENSION, ORAL (FINAL DOSE FORM) ORAL 4 TIMES DAILY PRN
Status: DISCONTINUED | OUTPATIENT
Start: 2022-08-01 | End: 2022-08-03 | Stop reason: HOSPADM

## 2022-08-01 RX ORDER — NALOXONE HCL 0.4 MG/ML
0.02 VIAL (ML) INJECTION
Status: DISCONTINUED | OUTPATIENT
Start: 2022-08-01 | End: 2022-08-03 | Stop reason: HOSPADM

## 2022-08-01 RX ORDER — MORPHINE SULFATE 4 MG/ML
4 INJECTION, SOLUTION INTRAMUSCULAR; INTRAVENOUS
Status: COMPLETED | OUTPATIENT
Start: 2022-08-01 | End: 2022-08-01

## 2022-08-01 RX ADMIN — MORPHINE SULFATE 4 MG: 4 INJECTION INTRAVENOUS at 05:08

## 2022-08-01 RX ADMIN — VANCOMYCIN HYDROCHLORIDE 2000 MG: 500 INJECTION, POWDER, LYOPHILIZED, FOR SOLUTION INTRAVENOUS at 08:08

## 2022-08-01 RX ADMIN — ONDANSETRON 4 MG: 2 INJECTION INTRAMUSCULAR; INTRAVENOUS at 05:08

## 2022-08-01 RX ADMIN — OXYCODONE 5 MG: 5 TABLET ORAL at 09:08

## 2022-08-01 RX ADMIN — SODIUM CHLORIDE, SODIUM LACTATE, POTASSIUM CHLORIDE, AND CALCIUM CHLORIDE 1000 ML: .6; .31; .03; .02 INJECTION, SOLUTION INTRAVENOUS at 06:08

## 2022-08-01 NOTE — ED PROVIDER NOTES
"Encounter Date: 8/1/2022       History     Chief Complaint   Patient presents with    Joint Swelling     Right elbow pain x2 days. States "doctor tried draining it and told me to come here." +2 radial pulse.      The history is provided by the patient and medical records. No  was used.      Kong Lamb is a 52 y.o. male with medical history of RLE cellulitis, Diverticulitis presenting to the ED with the chief complaint of joint swelling.     Patient reports chronic BL elbow swelling and pain that he contributes to "bumping" his elbow during his work as a . Reports having his elbows drained previously. Reports having R elbow swelling for the past week and was treated with an unknown antibiotic. Reports acutely worsening R elbow swelling and pain today. He was seen by his PCP who drained his elbow bursa. PCP was concerned about the amount of blood that was drained and referred him to the ED for further evaluation. Denies any specific trauma today. Reports having reduced ROM of his R elbow and was able to range his R elbow normally yesterday. No history of RUE surgeries. No blood thinner use. Reports marijuana use. Denies other recreational drug use.     Review of patient's allergies indicates:   Allergen Reactions    Pcn [penicillins] Anaphylaxis    Pcn [penicillins]      Past Medical History:   Diagnosis Date    Cellulitis      Past Surgical History:   Procedure Laterality Date    CHOLECYSTECTOMY       History reviewed. No pertinent family history.  Social History     Tobacco Use    Smoking status: Never Smoker    Smokeless tobacco: Never Used   Substance Use Topics    Alcohol use: No    Drug use: No     Review of Systems   Constitutional: Negative for fever.   HENT: Negative for sore throat.    Eyes: Negative for pain.   Respiratory: Negative for shortness of breath.    Cardiovascular: Negative for chest pain.   Gastrointestinal: Negative for nausea.   Genitourinary: Negative for " dysuria.   Musculoskeletal: Positive for arthralgias and joint swelling. Negative for back pain.   Skin: Positive for wound. Negative for rash.   Neurological: Negative for weakness.   Hematological: Does not bruise/bleed easily.       Physical Exam     Initial Vitals [08/01/22 1339]   BP Pulse Resp Temp SpO2   (!) 153/85 82 20 97.5 °F (36.4 °C) 97 %      MAP       --         Physical Exam    Constitutional: He appears well-developed and well-nourished. He is not diaphoretic. He is easily aroused. He appears distressed.   HENT:   Head: Normocephalic and atraumatic.   Mouth/Throat: Oropharynx is clear and moist. No oropharyngeal exudate.   Eyes: EOM and lids are normal. Pupils are equal, round, and reactive to light. No scleral icterus.   Neck: Phonation normal. Neck supple. No stridor present.   Normal range of motion.  Cardiovascular: Normal rate and regular rhythm.   +2 radial pulses   Pulmonary/Chest: Breath sounds normal. No stridor. No respiratory distress. He has no wheezes. He has no rales.   Abdominal: Abdomen is soft. He exhibits no distension. There is no abdominal tenderness. There is no rebound.   Musculoskeletal:         General: Tenderness and edema present. Normal range of motion.      Cervical back: Normal range of motion and neck supple.      Comments: RUE - Significant edema to distal humerus to proximal forearm with induration along lateral aspect. Ecchymosis noted to medial aspect. Olecranon bursa edematous with 2 incisional wounds with expressible serosanguinous fluid. Elbow held in partial extension with limited ROM 2/2 pain.      Neurological: He is alert, oriented to person, place, and time and easily aroused. He has normal strength. No sensory deficit.   Skin: Skin is warm and dry. No rash noted. No erythema.   Psychiatric: He has a normal mood and affect. His speech is normal.   Tangential      Right elbow:                ED Course   Procedures  Labs Reviewed   CBC W/ AUTO DIFFERENTIAL -  Abnormal; Notable for the following components:       Result Value    WBC 17.68 (*)     Immature Granulocytes 0.6 (*)     Gran # (ANC) 13.7 (*)     Immature Grans (Abs) 0.10 (*)     Mono # 1.3 (*)     Gran % 77.3 (*)     Lymph % 13.7 (*)     All other components within normal limits   COMPREHENSIVE METABOLIC PANEL - Abnormal; Notable for the following components:    CO2 22 (*)     Glucose 115 (*)     AST 75 (*)     ALT 64 (*)     All other components within normal limits   CK - Abnormal; Notable for the following components:    CPK 1535 (*)     All other components within normal limits   URINALYSIS, REFLEX TO URINE CULTURE - Abnormal; Notable for the following components:    Appearance, UA Cloudy (*)     Protein, UA 1+ (*)     All other components within normal limits    Narrative:     Specimen Source->Urine   URINALYSIS MICROSCOPIC - Abnormal; Notable for the following components:    RBC, UA 9 (*)     All other components within normal limits    Narrative:     Specimen Source->Urine   AFB CULTURE & SMEAR   CULTURE, FUNGUS   CULTURE, ANAEROBIC   CULTURE, AEROBIC  (SPECIFY SOURCE)   SEDIMENTATION RATE   C-REACTIVE PROTEIN   WBC & DIFF, BODY FLUID    Narrative:     Right elbow bursa   SARS-COV-2 RNA AMPLIFICATION, QUAL   HIV 1 / 2 ANTIBODY   HEPATITIS C ANTIBODY   SARS-COV-2 RDRP GENE          Imaging Results           CT Arm (Humerus) With Contrast Right (Final result)  Result time 08/01/22 23:39:15   Procedure changed from CT Arm Elbow With Contrast Rigt     Final result by Driss Ford MD (08/01/22 23:39:15)                 Impression:      Imaging extends from the right shoulder to the distal arm. Elbow and proximal forearm are only partially included.  The posterior aspect of the elbow and proximal forearm are not included in the field of view of imaging.    No acute fracture of the right humerus.    Soft tissue edema overlies the posterior distal portion of the humerus.  Field of view only extends to the  distal right arm.  The soft tissues posterior to the elbow and proximal forearm are not included on this exam.    This report was flagged in Epic as abnormal.    Electronically signed by resident: Caterina Melo  Date:    08/01/2022  Time:    22:50    Electronically signed by: Driss Ford MD  Date:    08/01/2022  Time:    23:39             Narrative:    EXAMINATION:  CT ARM (HUMERUS) WITH CONTRAST RIGHT    CLINICAL HISTORY:  Soft tissue infection suspected, elbow, xray done;Please include shoulder to proximal forearm;    TECHNIQUE:  Multiplanar computed tomography of the right humerus.    COMPARISON:  Right elbow x-ray 08/01/2022.    FINDINGS:  Imaging extends from the right shoulder to the distal arm.  Elbow and proximal forearm are only partially included.    Bones: Acromioclavicular joint demonstrates mild degenerative changes with subchondral cyst formation.  Glenohumeral joint demonstrate mild degenerative changes with small osteophyte formation and minimal subchondral sclerosis.  No acute fracture or dislocation.  No suspicious lytic or sclerotic lesions.    Soft tissues: Cholecystectomy.  Soft tissue edema overlying the posterior distal portion of the humerus.                               X-Ray Elbow Complete Right (Final result)  Result time 08/01/22 18:33:53    Final result by Rivera Young MD (08/01/22 18:33:53)                 Impression:      Posterior distal right upper arm and elbow localized soft tissue swelling/contusion without acute osseous process seen.      Electronically signed by: Rivera Young MD  Date:    08/01/2022  Time:    18:33             Narrative:    EXAMINATION:  XR ELBOW COMPLETE 3 VIEW RIGHT    CLINICAL HISTORY:  . Unspecified injury of right elbow, initial encounter    TECHNIQUE:  AP, lateral, and oblique views of the right elbow were performed.    COMPARISON:  None    FINDINGS:  Nonspecific soft tissue swelling noted about the posterior distal right upper arm and also elbow.   No large elbow joint effusion.    Bones are well mineralized.  Overall alignment is within normal limits.  Minimal degenerative change about the elbow.  No displaced fracture, dislocation or destructive osseous process.  Tiny posterior olecranon spur.  No subcutaneous emphysema or radiodense retained foreign body.                                 Medications   iohexoL (OMNIPAQUE 350) injection 100 mL (has no administration in time range)   vancomycin - pharmacy to dose (has no administration in time range)   aztreonam 1 g in dextrose 5 % 50 mL IVPB (ready to mix system) (has no administration in time range)   sodium chloride 0.9% flush 10 mL (has no administration in time range)   naloxone 0.4 mg/mL injection 0.02 mg (has no administration in time range)   glucose chewable tablet 16 g (has no administration in time range)   glucose chewable tablet 24 g (has no administration in time range)   glucagon (human recombinant) injection 1 mg (has no administration in time range)   acetaminophen tablet 1,000 mg (has no administration in time range)   oxyCODONE immediate release tablet 5 mg (5 mg Oral Given 8/1/22 2130)   ondansetron injection 4 mg (has no administration in time range)   senna-docusate 8.6-50 mg per tablet 1 tablet (has no administration in time range)   albuterol-ipratropium 2.5 mg-0.5 mg/3 mL nebulizer solution 3 mL (has no administration in time range)   melatonin tablet 6 mg (has no administration in time range)   aluminum-magnesium hydroxide-simethicone 200-200-20 mg/5 mL suspension 30 mL (has no administration in time range)   dextrose 10% bolus 125 mL (has no administration in time range)   dextrose 10% bolus 250 mL (has no administration in time range)   vancomycin 1.5 g in dextrose 5 % 250 mL IVPB (ready to mix) (1,500 mg Intravenous Trough Due As Scheduled Before Dose 8/3/22 0800)   morphine injection 4 mg (4 mg Intravenous Given 8/1/22 1718)   ondansetron injection 4 mg (4 mg Intravenous Given  8/1/22 1718)   lactated ringers bolus 1,000 mL (0 mLs Intravenous Stopped 8/1/22 1908)   aztreonam 1 g in dextrose 5 % 50 mL IVPB (ready to mix system) (0 mg Intravenous Stopped 8/2/22 0031)   vancomycin 2 g in dextrose 5 % 500 mL IVPB (0 mg Intravenous Stopped 8/1/22 2330)     Medical Decision Making:   History:   Old Medical Records: I decided to obtain old medical records.  Old Records Summarized: records from clinic visits.  Clinical Tests:   Lab Tests: Ordered and Reviewed  Radiological Study: Ordered and Reviewed  Other:   I have discussed this case with another health care provider.       <> Summary of the Discussion: Orthopedics,        APC / Resident Notes:   52 y.o. male with medical history of RLE cellulitis, Diverticulitis presenting to the ED c/o acute on chronic R elbow pain and swelling. Saw PCP after bursa I&D who was concerned about serosanguinous output and referred to the ED. Recently treated with unknown antibiotic. DDX includes but not limited to bursitis, myositis, cellulitis, abscess, hematoma, septic joint, arterial injury, nerve injury, compartment syndrome, foreign body presence, fracture, dislocation.    Ortho consulted and evaluated at bedside. Suspect septic bursitis as most likely etiology. Lower suspicion for compartment syndrome. Bursa aspirated at bedside. CT upper arm limited as it did not include his elbow in window. Distal humerus has soft tissue edema. Discussed imaging with Ortho, deferring additional imaging at this time. Vanc and Aztreonam ordered for infectious coverage as patient as throat swelling allergy with penicillin as a child. Denies prior cephalosporin use to his knowledge. Discussed with , admitted for further management. Patient expresses understanding and agreeable to the plan. I have discussed the care of this patient with my supervising physician.        Attending Attestation:     Physician Attestation Statement for NP/PA:       Other NP/PA Attestation  Additions:    History of Present Illness: Progressive R arm swelling, severe pain with movement of the elbow, he states PCP drained olecranon bursa which has helped the swelling some.   Physical Exam: Unable to range elbow much at all, significant edema and concerned for posterior upper arm compartment tightness as that area is very tender to palpation.  Swelling is more significant above the elbow than of the elbow joint itself.  Olecranon bursa is swollen.   Medical Decision Making: Ortho consulted given elevated CK and concern for compartment syndrome, they felt unlikely compartment syndrome and drained olecranon bursa.  Exam somewhat suggestive of large hematoma which may be involved too given large amount of bruising on the upper arm.  CT elbow ordered.  Admitted for IV abx and further monitoring.             ED Course as of 08/02/22 0130   Mon Aug 01, 2022   1822 CPK(!): 1535 [BA]   1822 WBC(!): 17.68 [BA]   1822 CRP: 6.5 [BA]   1822 Sed Rate: 15 [BA]   1822 Creatinine: 1.1 [BA]   1822 Orthopedics consulted [BA]      ED Course User Index  [BA] Rivera Moon PA-C             Clinical Impression:   Final diagnoses:  [S59.901A] Elbow injury, right, initial encounter  [M71.10] Septic bursitis (Primary)  [R74.8] Elevated CPK          ED Disposition Condition    Admit               Rivera Moon PA-C  08/02/22 0130       Hal Cantu MD  08/02/22 1903

## 2022-08-02 PROBLEM — E66.9 OBESE: Status: ACTIVE | Noted: 2022-08-02

## 2022-08-02 LAB
ALBUMIN SERPL BCP-MCNC: 3.2 G/DL (ref 3.5–5.2)
ALP SERPL-CCNC: 71 U/L (ref 55–135)
ALT SERPL W/O P-5'-P-CCNC: 55 U/L (ref 10–44)
ANION GAP SERPL CALC-SCNC: 9 MMOL/L (ref 8–16)
AST SERPL-CCNC: 54 U/L (ref 10–40)
BASOPHILS # BLD AUTO: 0.02 K/UL (ref 0–0.2)
BASOPHILS NFR BLD: 0.2 % (ref 0–1.9)
BILIRUB SERPL-MCNC: 0.6 MG/DL (ref 0.1–1)
BUN SERPL-MCNC: 17 MG/DL (ref 6–20)
CALCIUM SERPL-MCNC: 8.8 MG/DL (ref 8.7–10.5)
CHLORIDE SERPL-SCNC: 104 MMOL/L (ref 95–110)
CK SERPL-CCNC: 863 U/L (ref 20–200)
CO2 SERPL-SCNC: 24 MMOL/L (ref 23–29)
CREAT SERPL-MCNC: 1 MG/DL (ref 0.5–1.4)
DIFFERENTIAL METHOD: ABNORMAL
EOSINOPHIL # BLD AUTO: 0.3 K/UL (ref 0–0.5)
EOSINOPHIL NFR BLD: 2.7 % (ref 0–8)
ERYTHROCYTE [DISTWIDTH] IN BLOOD BY AUTOMATED COUNT: 13.4 % (ref 11.5–14.5)
EST. GFR  (NO RACE VARIABLE): >60 ML/MIN/1.73 M^2
GLUCOSE SERPL-MCNC: 99 MG/DL (ref 70–110)
HCT VFR BLD AUTO: 42 % (ref 40–54)
HGB BLD-MCNC: 14.3 G/DL (ref 14–18)
HIV 1+2 AB+HIV1 P24 AG SERPL QL IA: NEGATIVE
IMM GRANULOCYTES # BLD AUTO: 0.08 K/UL (ref 0–0.04)
IMM GRANULOCYTES NFR BLD AUTO: 0.7 % (ref 0–0.5)
LYMPHOCYTES # BLD AUTO: 3.3 K/UL (ref 1–4.8)
LYMPHOCYTES NFR BLD: 27.6 % (ref 18–48)
MCH RBC QN AUTO: 30.8 PG (ref 27–31)
MCHC RBC AUTO-ENTMCNC: 34 G/DL (ref 32–36)
MCV RBC AUTO: 90 FL (ref 82–98)
MONOCYTES # BLD AUTO: 1.1 K/UL (ref 0.3–1)
MONOCYTES NFR BLD: 9.3 % (ref 4–15)
NEUTROPHILS # BLD AUTO: 7.1 K/UL (ref 1.8–7.7)
NEUTROPHILS NFR BLD: 59.5 % (ref 38–73)
NRBC BLD-RTO: 0 /100 WBC
PLATELET # BLD AUTO: 205 K/UL (ref 150–450)
PMV BLD AUTO: 10.6 FL (ref 9.2–12.9)
POTASSIUM SERPL-SCNC: 3.9 MMOL/L (ref 3.5–5.1)
PROT SERPL-MCNC: 5.6 G/DL (ref 6–8.4)
RBC # BLD AUTO: 4.65 M/UL (ref 4.6–6.2)
SODIUM SERPL-SCNC: 137 MMOL/L (ref 136–145)
WBC # BLD AUTO: 11.94 K/UL (ref 3.9–12.7)

## 2022-08-02 PROCEDURE — 82550 ASSAY OF CK (CPK): CPT | Performed by: FAMILY MEDICINE

## 2022-08-02 PROCEDURE — 99233 PR SUBSEQUENT HOSPITAL CARE,LEVL III: ICD-10-PCS | Mod: ,,, | Performed by: HOSPITALIST

## 2022-08-02 PROCEDURE — 36415 COLL VENOUS BLD VENIPUNCTURE: CPT | Performed by: FAMILY MEDICINE

## 2022-08-02 PROCEDURE — 85025 COMPLETE CBC W/AUTO DIFF WBC: CPT | Performed by: FAMILY MEDICINE

## 2022-08-02 PROCEDURE — 63600175 PHARM REV CODE 636 W HCPCS: Performed by: HOSPITALIST

## 2022-08-02 PROCEDURE — 94761 N-INVAS EAR/PLS OXIMETRY MLT: CPT

## 2022-08-02 PROCEDURE — 25000003 PHARM REV CODE 250: Performed by: HOSPITALIST

## 2022-08-02 PROCEDURE — 99221 1ST HOSP IP/OBS SF/LOW 40: CPT | Mod: ,,, | Performed by: ORTHOPAEDIC SURGERY

## 2022-08-02 PROCEDURE — 20600001 HC STEP DOWN PRIVATE ROOM

## 2022-08-02 PROCEDURE — 80053 COMPREHEN METABOLIC PANEL: CPT | Performed by: FAMILY MEDICINE

## 2022-08-02 PROCEDURE — 99223 1ST HOSP IP/OBS HIGH 75: CPT | Mod: ,,, | Performed by: INTERNAL MEDICINE

## 2022-08-02 PROCEDURE — 99233 SBSQ HOSP IP/OBS HIGH 50: CPT | Mod: ,,, | Performed by: HOSPITALIST

## 2022-08-02 PROCEDURE — 99223 PR INITIAL HOSPITAL CARE,LEVL III: ICD-10-PCS | Mod: ,,, | Performed by: INTERNAL MEDICINE

## 2022-08-02 PROCEDURE — 99221 PR INITIAL HOSPITAL CARE,LEVL I: ICD-10-PCS | Mod: ,,, | Performed by: ORTHOPAEDIC SURGERY

## 2022-08-02 RX ORDER — OXYCODONE HYDROCHLORIDE 10 MG/1
10 TABLET ORAL EVERY 4 HOURS PRN
Status: DISCONTINUED | OUTPATIENT
Start: 2022-08-02 | End: 2022-08-03

## 2022-08-02 RX ORDER — MORPHINE SULFATE 2 MG/ML
6 INJECTION, SOLUTION INTRAMUSCULAR; INTRAVENOUS EVERY 4 HOURS PRN
Status: DISCONTINUED | OUTPATIENT
Start: 2022-08-02 | End: 2022-08-03

## 2022-08-02 RX ADMIN — OXYCODONE HYDROCHLORIDE 10 MG: 10 TABLET ORAL at 10:08

## 2022-08-02 RX ADMIN — MORPHINE SULFATE 6 MG: 2 INJECTION, SOLUTION INTRAMUSCULAR; INTRAVENOUS at 07:08

## 2022-08-02 RX ADMIN — VANCOMYCIN HYDROCHLORIDE 1500 MG: 1.5 INJECTION, POWDER, LYOPHILIZED, FOR SOLUTION INTRAVENOUS at 08:08

## 2022-08-02 NOTE — PLAN OF CARE
Problem: Adult Inpatient Plan of Care  Goal: Plan of Care Review  Outcome: Ongoing, Progressing     Problem: Adult Inpatient Plan of Care  Goal: Absence of Hospital-Acquired Illness or Injury  Outcome: Ongoing, Progressing     Problem: Adult Inpatient Plan of Care  Goal: Readiness for Transition of Care  Outcome: Ongoing, Progressing     Problem: Impaired Wound Healing  Goal: Optimal Wound Healing  Outcome: Ongoing, Progressing     Patient is AAOX4, VSS, NAD. C/o pain. PRN pain medication given. Plan of care reviewed and explained. Patient verbalized understanding. Bed in low and locked position, side rails up x 2, call light within reach.

## 2022-08-02 NOTE — SUBJECTIVE & OBJECTIVE
"Principal Problem:Septic bursitis of elbow, right    Principal Orthopedic Problem: Same    Interval History: Pt seen and examined at bedside. ALFREDA HAWK, AF. Pain controlled. Reports that his edema has decreased and his pain is improving.      Review of patient's allergies indicates:   Allergen Reactions    Pcn [penicillins] Anaphylaxis    Pcn [penicillins]        Current Facility-Administered Medications   Medication    acetaminophen tablet 1,000 mg    albuterol-ipratropium 2.5 mg-0.5 mg/3 mL nebulizer solution 3 mL    aluminum-magnesium hydroxide-simethicone 200-200-20 mg/5 mL suspension 30 mL    aztreonam 1 g in dextrose 5 % 50 mL IVPB (ready to mix system)    dextrose 10% bolus 125 mL    dextrose 10% bolus 250 mL    glucagon (human recombinant) injection 1 mg    glucose chewable tablet 16 g    glucose chewable tablet 24 g    iohexoL (OMNIPAQUE 350) injection 100 mL    melatonin tablet 6 mg    naloxone 0.4 mg/mL injection 0.02 mg    ondansetron injection 4 mg    oxyCODONE immediate release tablet 5 mg    senna-docusate 8.6-50 mg per tablet 1 tablet    sodium chloride 0.9% flush 10 mL    vancomycin - pharmacy to dose    vancomycin 1.5 g in dextrose 5 % 250 mL IVPB (ready to mix)     Objective:     Vital Signs (Most Recent):  Temp: 97.7 °F (36.5 °C) (08/02/22 0746)  Pulse: 68 (08/02/22 0746)  Resp: 16 (08/02/22 0746)  BP: 119/65 (08/02/22 0746)  SpO2: 98 % (08/02/22 0746) Vital Signs (24h Range):  Temp:  [97.5 °F (36.4 °C)-98 °F (36.7 °C)] 97.7 °F (36.5 °C)  Pulse:  [63-82] 68  Resp:  [16-20] 16  SpO2:  [95 %-99 %] 98 %  BP: (119-153)/(60-90) 119/65     Weight: 102.1 kg (225 lb)  Height: 5' 11" (180.3 cm)  Body mass index is 31.38 kg/m².      Intake/Output Summary (Last 24 hours) at 8/2/2022 0801  Last data filed at 8/1/2022 4243  Gross per 24 hour   Intake 1000 ml   Output --   Net 1000 ml       Ortho/SPM Exam  Right Upper extremity  Inspection  - Previous small incisions at site of aspirations to the skin " "overlying the olecranon with small area of ecchymosis  - Edema to olecranon bursa, decreasing  Palpation  - TTP to olecranon and distal posterior arm  Range of motion  - AROM and PROM of the shoulder, elbow, wrist, and hand intact, pain with flexion and extension of the arm decreased from previous exam but with increasing ROM, no pain with pronation and supination of the arm  Stability  - No evidence of joint dislocation or abnormal laxity  Neurovascular  - AIN/PIN/Radial/Median/Ulnar Nerves assessed in isolation without deficit  - Able to give thumbs up, make "OK" sign, cross IF/LF, abduct/adduct fingers, make fist  - SILT throughout  - Compartments soft  - Radial artery palpated  - Muscle tone normal    Significant Labs: CBC:   Recent Labs   Lab 08/01/22 1659 08/02/22  0257   WBC 17.68* 11.94   HGB 16.4 14.3   HCT 47.1 42.0    205     CMP:   Recent Labs   Lab 08/01/22 1659 08/02/22  0257    137   K 4.0 3.9    104   CO2 22* 24   * 99   BUN 18 17   CREATININE 1.1 1.0   CALCIUM 9.3 8.8   PROT 7.1 5.6*   ALBUMIN 4.0 3.2*   BILITOT 0.5 0.6   ALKPHOS 84 71   AST 75* 54*   ALT 64* 55*   ANIONGAP 11 9     All pertinent labs within the past 24 hours have been reviewed.    Significant Imaging: I have reviewed all pertinent imaging results/findings.  "

## 2022-08-02 NOTE — CARE UPDATE
Procedure Note: Right Olecranon Bursa Aspiration  Verbalized consent was given by patient to proceed with aspiration. A 21 gauge needle on a 30 cc syringe was used to aspirate the bursa on the superior and posterior aspect of the bursa. 2 cc of bloody fluid obtained. Fluid sent for cell count and cultures. Aspiration site was covered with gauze and the extremity was wrapped in ACE wrap. Patient tolerated the procedure well and without complication.

## 2022-08-02 NOTE — PROGRESS NOTES
"[facility] - Tele[facility] Medicine  Progress Note    Patient Name: Kong Lamb  MRN: [id number]  Patient Class: IP- Inpatient   Admission Date: 8/1/2022  Length of Stay: 1 days  Attending Physician: Janet Jeffries MD  Primary Care Provider: Primary Doctor No        Subjective:     Principal Problem:Septic bursitis of elbow, right        HPI:  Kong Lamb is a 52 y.o. male with medical history of RLE cellulitis, Diverticulitis presenting to the ED with the chief complaint of joint swelling. Patient reports chronic BL elbow swelling and pain that he contributes to "bumping" his elbow during his work as a . Reports having his elbows drained previously. Reports having R elbow swelling for the past week and was treated with an unknown antibiotic. Reports acutely worsening R elbow swelling and pain today. He was seen by his PCP who drained his elbow bursa. PCP was concerned about the amount of blood that was drained and referred him to the ED for further evaluation. Denies any specific trauma today. Reports having reduced ROM of his R elbow and was able to range his R elbow normally yesterday. No history of RUE surgeries. No blood thinner use. Reports marijuana use. Denies other recreational drug use.     In the ED patient afebrile and hemodynamically stable saturating well on room air. WBC 17.68. CPK 1535. ESR and CRP wnl. Orthopedic surgery consulted and evaluated patient. Ortho consulted and suspect infected bursa with a degree of myositis. CT elbow pending. Ortho performed arthrocentesis at bedside. Vanc and Aztreonam ordered. Patient admitted to the care of medicine for further evaluation and management.      Overview/Hospital Course:  Mr. Lamb was admitted to Hospital Medicine for management of septic bursitis.  Ortho was consulted and performed an aspiration on 8/1.  He was started on Vanc and Aztreonam.  ID was consulted.      Interval History: Admitted overnight.  This morning endorses improved " pain.  Discussed waiting on cultures and ID consult.    Review of Systems   Constitutional:  Negative for chills, fatigue and fever.   Respiratory:  Negative for cough and shortness of breath.    Cardiovascular:  Negative for chest pain, palpitations and leg swelling.   Gastrointestinal:  Negative for abdominal pain, diarrhea, nausea and vomiting.   Genitourinary:  Negative for dysuria and urgency.   Musculoskeletal:  Positive for joint swelling.   Neurological:  Negative for dizziness and headaches.   All other systems reviewed and are negative.  Objective:     Vital Signs (Most Recent):  Temp: 97.6 °F (36.4 °C) (08/02/22 1159)  Pulse: 72 (08/02/22 1159)  Resp: 20 (08/02/22 1159)  BP: 128/74 (08/02/22 1159)  SpO2: 97 % (08/02/22 1159) Vital Signs (24h Range):  Temp:  [97.5 °F (36.4 °C)-98 °F (36.7 °C)] 97.6 °F (36.4 °C)  Pulse:  [63-82] 72  Resp:  [16-20] 20  SpO2:  [95 %-99 %] 97 %  BP: (119-153)/(60-90) 128/74     Weight: 102.1 kg (225 lb)  Body mass index is 31.38 kg/m².    Intake/Output Summary (Last 24 hours) at 8/2/2022 1311  Last data filed at 8/1/2022 1908  Gross per 24 hour   Intake 1000 ml   Output --   Net 1000 ml      Physical Exam  Constitutional:       Appearance: Normal appearance. He is well-developed. He is obese.   HENT:      Head: Normocephalic and atraumatic.   Cardiovascular:      Rate and Rhythm: Normal rate and regular rhythm.      Heart sounds: No murmur heard.  Pulmonary:      Effort: Pulmonary effort is normal. No respiratory distress.      Breath sounds: Normal breath sounds. No wheezing or rales.   Abdominal:      General: There is no distension.      Palpations: Abdomen is soft.      Tenderness: There is no abdominal tenderness.   Musculoskeletal:         General: Swelling (Right elbow, wrapped) present. No deformity.   Skin:     General: Skin is warm.   Neurological:      General: No focal deficit present.      Mental Status: He is alert and oriented to person, place, and time. Mental  status is at baseline.       Significant Labs: All pertinent labs within the past 24 hours have been reviewed.  CBC:   Recent Labs   Lab 08/01/22  1659 08/02/22  0257   WBC 17.68* 11.94   HGB 16.4 14.3   HCT 47.1 42.0    205     CMP:   Recent Labs   Lab 08/01/22  1659 08/02/22  0257    137   K 4.0 3.9    104   CO2 22* 24   * 99   BUN 18 17   CREATININE 1.1 1.0   CALCIUM 9.3 8.8   PROT 7.1 5.6*   ALBUMIN 4.0 3.2*   BILITOT 0.5 0.6   ALKPHOS 84 71   AST 75* 54*   ALT 64* 55*   ANIONGAP 11 9       Significant Imaging: I have reviewed all pertinent imaging results/findings within the past 24 hours.      Assessment/Plan:      * Septic bursitis of elbow, right  · Xray Posterior distal right upper arm and elbow localized soft tissue swelling/contusion without acute osseous process seen.   · CT elbow with soft tissue edema  · Ortho consulted  · S/p Ortho aspiration on 8/1  · Cx NGTD  · WBC normalized  · Continue Vanc and Aztreonam.  ID consulted  · Pain control with prn Oxy IR and IV Morphine    Obese  · Body mass index is 31.38 kg/m².   · Morbid obesity complicates all aspects of disease management from diagnostic modalities to treatment.   · Weight loss encouraged and health benefits explained to patient.         Myositis  · CPK 1500 on admit, trending down      VTE Risk Mitigation (From admission, onward)         Ordered     IP VTE HIGH RISK PATIENT  Once         08/01/22 2108     Place sequential compression device  Until discontinued         08/01/22 2108                Discharge Planning   PARVEEN:      Code Status: Full Code   Is the patient medically ready for discharge?:     Reason for patient still in hospital (select all that apply): Patient trending condition                     Janet Jeffries MD  Department of Hospital Medicine   [facility] - Telemetry Stepdown

## 2022-08-02 NOTE — ASSESSMENT & PLAN NOTE
Kong Lamb is a 52 y.o. male with spetic olecranon bursitis of the right elbow. Orthopedics initially consulted for concern for compartment syndrome due to edema vs septic joint.     - No concern for acute compartment syndrome, all compartments were soft and compressible, recommend ACE wrap around the entire arm for edema control  - No concern for septic arthritis at this time, pain on flexion and extension of the arm is due to irration at olecranon bursa rather than joint as evidenced by painless pronation and supination of the arm, CRP and ESR WNL  - Olecranon bursa aspirated at bedside, sent for culture and cell count to assist with tailoring of Abx, NGTD, cell count consistent with hematoma aspiration, suspect due to previous aspiration by PCP decreasing yeild  - WBAT RUE, ROMAT RUE  - Recommend empiric IV abx for progressive infection  - Orthopedics will continue to follow

## 2022-08-02 NOTE — PLAN OF CARE
[facility] - Telemetry Stepdown  Initial Discharge Assessment       Primary Care Provider: Wes Singh MD    Admission Diagnosis: Chest pain [R07.9]  Elbow injury, right, initial encounter [S59.901A]    Admission Date: 8/1/2022  Expected Discharge Date: 8/3/2022    Discharge Barriers Identified: None    Payor: MEDICAID / Plan: [employer] [facility] ([employer]) / Product Type: Managed Medicaid /     Extended Emergency Contact Information  Primary Emergency Contact: Azeem Yeung  Mobile Phone: [phone]  Relation: Sister  Preferred language: English   needed? No    Discharge Plan A: Home with family  Discharge Plan B: Home with family    No Pharmacies Listed    Initial Assessment (most recent)     Adult Discharge Assessment - 08/02/22 1354        Discharge Assessment    Assessment Type Discharge Planning Assessment     Confirmed/corrected address, phone number and insurance Yes     Confirmed Demographics Correct on Facesheet     Source of Information patient     Communicated PARVEEN with patient/caregiver Yes     Reason For Admission Elbow injury     Lives With significant other;child(alanis), adult     Do you expect to return to your current living situation? Yes     Do you have help at home or someone to help you manage your care at home? Yes     Prior to hospitilization cognitive status: Alert/Oriented     Current cognitive status: Alert/Oriented     Walking or Climbing Stairs Difficulty none     Dressing/Bathing Difficulty none     Equipment Currently Used at Home none     Readmission within 30 days? No     Patient currently being followed by outpatient case management? No     Do you currently have service(s) that help you manage your care at home? No     Do you take prescription medications? Yes     Do you have prescription coverage? Yes     Do you have any problems affording any of your prescribed medications? No     Is the patient taking medications as prescribed? yes     Who is going to help  you get home at discharge? Patient's family will provide transportation.     How do you get to doctors appointments? car, drives self;family or friend will provide     Are you on dialysis? No     Do you take coumadin? No     Discharge Plan A Home with family     Discharge Plan B Home with family     DME Needed Upon Discharge  none     Discharge Plan discussed with: Patient     Discharge Barriers Identified None               Maddy Campos RN  Ext [phone]

## 2022-08-02 NOTE — ASSESSMENT & PLAN NOTE
Kong Lamb is a 52 y.o. male with spetic olecranon bursitis of the right elbow. Orthopedics initially consulted for concern for compartment syndrome due to edema vs septic joint.     - No concern for acute compartment syndrome, all compartments were soft and compressible, recommend ACE wrap around the entire arm for edema control  - No concern for septic arthritis at this time, pain on flexion and extension of the arm is due to irration at olecranon bursa rather than joint as evidenced by painless pronation and supination of the arm, CRP and ESR WNL  - Olecranon bursa aspirated at bedside, sent for culture and cell count to assist with tailoring of Abx   - WBAT RUE, ROMAT RUE  - Recommend empiric IV abx for progressive infection  - Orthopedics will continue to follow

## 2022-08-02 NOTE — HOSPITAL COURSE
Mr. Lamb was admitted to Hospital Medicine for management of septic bursitis.  Ortho was consulted and performed an aspiration on 8/1.  He was started on Vanc and Aztreonam.  ID was consulted.  Cultures remained negative.  He was discharged on Doxy for 2 weeks.  He was given a referral to ID and Ortho on DC.

## 2022-08-02 NOTE — PLAN OF CARE
"Brief Compartment Check - Orthopedic Surgery  Orthopedics consulted for concern for compartment syndrome. To bedside within 5 minutes of receiving consult.       Pt seen and examined at bedside. Pain controlled.  VSS.     Vitals:    08/01/22 1339 08/01/22 1718 08/01/22 1812   BP: (!) 153/85  (!) 139/90   Pulse: 82  70   Resp: 20 20 18   Temp: 97.5 °F (36.4 °C)     TempSrc: Oral     SpO2: 97%  98%   Weight: 102.1 kg (225 lb)     Height: 5' 11" (1.803 m)        Temp:  [97.5 °F (36.4 °C)]      Pain: controlled  Pallor: none  Paraesthesias: none  Poikilothermia: none, extremity is WWP  Pulse:  2+ radial,  Paralysis: none, ROM of shoulder, elbow, wrist, and hand intact  Compartments: compressible    Gen: edema to olecranon bursa with warmth    Assessment and Plan:  No concern for compartment syndrome at this time  Exam consistent with septic olecranon bursitis, full consult note to follow      Joce Blake MD PGY-2  Orthopedic Surgery    "

## 2022-08-02 NOTE — H&P
"[facility] - [facility] Medicine  History & Physical    Patient Name: Kong Lamb  MRN: [id number]  Patient Class: IP- Inpatient  Admission Date: 8/1/2022  Attending Physician: Janet Jeffries MD   Primary Care Provider: Primary Doctor No         Patient information was obtained from patient, past medical records and ER records.     Subjective:     Principal Problem:Septic bursitis of elbow, right    Chief Complaint:   Chief Complaint   Patient presents with    Joint Swelling     Right elbow pain x2 days. States "doctor tried draining it and told me to come here." +2 radial pulse.         HPI: Kong Lamb is a 52 y.o. male with medical history of RLE cellulitis, Diverticulitis presenting to the ED with the chief complaint of joint swelling. Patient reports chronic BL elbow swelling and pain that he contributes to "bumping" his elbow during his work as a . Reports having his elbows drained previously. Reports having R elbow swelling for the past week and was treated with an unknown antibiotic. Reports acutely worsening R elbow swelling and pain today. He was seen by his PCP who drained his elbow bursa. PCP was concerned about the amount of blood that was drained and referred him to the ED for further evaluation. Denies any specific trauma today. Reports having reduced ROM of his R elbow and was able to range his R elbow normally yesterday. No history of RUE surgeries. No blood thinner use. Reports marijuana use. Denies other recreational drug use.     In the ED patient afebrile and hemodynamically stable saturating well on room air. WBC 17.68. CPK 1535. ESR and CRP wnl. Orthopedic surgery consulted and evaluated patient. Ortho consulted and suspect infected bursa with a degree of myositis. CT elbow pending. Ortho performed arthrocentesis at bedside. Vanc and Aztreonam ordered. Patient admitted to the care of medicine for further evaluation and management.      Past Medical History:   Diagnosis " Date    Cellulitis        Past Surgical History:   Procedure Laterality Date    CHOLECYSTECTOMY         Review of patient's allergies indicates:   Allergen Reactions    Pcn [penicillins] Anaphylaxis    Pcn [penicillins]        No current facility-administered medications on file prior to encounter.     Current Outpatient Medications on File Prior to Encounter   Medication Sig    diazePAM (VALIUM) 5 MG tablet Take 1 tablet (5 mg total) by mouth every 6 (six) hours as needed (muscle spasm).    HYDROcodone-acetaminophen (NORCO) 5-325 mg per tablet Take 1 tablet by mouth every 6 (six) hours as needed.     Family History    None       Tobacco Use    Smoking status: Never Smoker    Smokeless tobacco: Never Used   Substance and Sexual Activity    Alcohol use: No    Drug use: No    Sexual activity: Not on file     Review of Systems   Constitutional:  Negative for chills, fatigue and fever.   HENT:  Negative for sore throat and trouble swallowing.    Eyes:  Negative for photophobia and visual disturbance.   Respiratory:  Negative for cough, shortness of breath and wheezing.    Cardiovascular:  Negative for chest pain, palpitations and leg swelling.   Gastrointestinal:  Negative for abdominal distention, abdominal pain, diarrhea, nausea and vomiting.   Genitourinary:  Negative for dysuria and hematuria.   Musculoskeletal:  Positive for arthralgias and joint swelling. Negative for neck pain and neck stiffness.   Skin:  Negative for rash and wound.   Neurological:  Negative for seizures, syncope, weakness, light-headedness and headaches.   Psychiatric/Behavioral:  Negative for confusion and decreased concentration.    Objective:     Vital Signs (Most Recent):  Temp: 98 °F (36.7 °C) (08/01/22 2204)  Pulse: 76 (08/01/22 2204)  Resp: 20 (08/01/22 2204)  BP: 138/84 (08/01/22 2204)  SpO2: 96 % (08/01/22 2204) Vital Signs (24h Range):  Temp:  [97.5 °F (36.4 °C)-98 °F (36.7 °C)] 98 °F (36.7 °C)  Pulse:  [70-82] 76  Resp:   [18-20] 20  SpO2:  [96 %-99 %] 96 %  BP: (138-153)/(81-90) 138/84     Weight: 102.1 kg (225 lb)  Body mass index is 31.38 kg/m².    Physical Exam  Constitutional:       General: He is not in acute distress.     Appearance: He is not toxic-appearing or diaphoretic.   HENT:      Head: Normocephalic and atraumatic.      Nose: Nose normal.   Eyes:      General: No scleral icterus.     Extraocular Movements: Extraocular movements intact.      Pupils: Pupils are equal, round, and reactive to light.   Cardiovascular:      Rate and Rhythm: Normal rate and regular rhythm.   Pulmonary:      Effort: Pulmonary effort is normal. No respiratory distress.      Breath sounds: No wheezing or rales.   Abdominal:      General: Abdomen is flat. There is no distension.      Palpations: Abdomen is soft.      Tenderness: There is no abdominal tenderness. There is no guarding.   Musculoskeletal:      Cervical back: Normal range of motion and neck supple. No rigidity.      Right lower leg: No edema.      Left lower leg: No edema.      Comments: RUE - edema surrounding elbow and elbow held in partial extension with limited ROM 2/2 pain. Wrapped in compressive dressing and dressings clean dry and intact. Distal pulses intact. Sensation intact.   Skin:     General: Skin is warm and dry.      Coloration: Skin is not jaundiced.   Neurological:      General: No focal deficit present.      Mental Status: He is alert and oriented to person, place, and time.      Cranial Nerves: No cranial nerve deficit.   Psychiatric:         Mood and Affect: Mood normal.         Behavior: Behavior normal.         CRANIAL NERVES     CN III, IV, VI   Pupils are equal, round, and reactive to light.     Significant Labs: All pertinent labs within the past 24 hours have been reviewed.  CBC:   Recent Labs   Lab 08/01/22  1659   WBC 17.68*   HGB 16.4   HCT 47.1        CMP:   Recent Labs   Lab 08/01/22  1659      K 4.0      CO2 22*   *   BUN 18    CREATININE 1.1   CALCIUM 9.3   PROT 7.1   ALBUMIN 4.0   BILITOT 0.5   ALKPHOS 84   AST 75*   ALT 64*   ANIONGAP 11       Significant Imaging: I have reviewed all pertinent imaging results/findings within the past 24 hours.    Assessment/Plan:     * Septic bursitis of elbow, right  - Orthopedic surgery consulted  ;  Appreciate recs  - sp arthrocentesis in ED  - follow up fluid studies and culture  - follow up blood culture  - continue Vanc and aztreonam  - Xray Posterior distal right upper arm and elbow localized soft tissue swelling/contusion without acute osseous process seen.   - CT elbow pending  - further management pending clinical course and future study review    Myositis  - see management above        VTE Risk Mitigation (From admission, onward)         Ordered     IP VTE HIGH RISK PATIENT  Once         08/01/22 2108     Place sequential compression device  Until discontinued         08/01/22 2108                   Rivera Knight MD  Department of Hospital Medicine   [facility] - Telemetry Stepdown

## 2022-08-02 NOTE — PLAN OF CARE
Patient arrived to unit from ED @ 2150. Room air. AAO x 4. No distress. IV infusing vanc @ 200 mL/hr. Ace wrap to RUE, denies numbness/tingling. No discoloration of extremity, warm to touch, capillary refill less than 3 seconds. Reports pain 2/10 after receiving Percocet. Oriented to unit. Bed locked and lowered. Accompanied by son at bedside. Call light in reach.      Problem: Adult Inpatient Plan of Care  Goal: Plan of Care Review  Outcome: Ongoing, Progressing  Flowsheets (Taken 8/2/2022 4190)  Plan of Care Reviewed With: patient     Problem: Impaired Wound Healing  Goal: Optimal Wound Healing  Outcome: Ongoing, Progressing

## 2022-08-02 NOTE — HPI
Kong Lamb is a 52 y.o. male with PMH significant for RLE cellulitis and diverticulitis presenting with right elbow pain. He reports that he has ongoing right elbow swelling at the posterior aspect of his olecranon for two weeks. This was previously drained by an outside provider but he then had increasing pain today and presented to his PCP. His PCP drained the abscess again and the patient reports that a large amount of fluid was removed. He was then instructed to present to the ED. Patient denies numbness and tingling. Denies any other musculoskeletal pain or injuries. No known history of prior right elbow injury or surgery. He reports a previous history of edema and inflammation to his left elbow which he removed on his own. He is right hand dominant.   Doesn't take any anticoagulation at baseline.

## 2022-08-02 NOTE — CONSULTS
"[facility] - Tele[facility] Step[facility]  Orthopedics  Consult Note    Patient Name: Kong Lamb  MRN: [id number]  Admission Date: 8/1/2022  Hospital Length of Stay: 0 days  Attending Provider: Janet Jeffries MD  Primary Care Provider: Primary Doctor No      Inpatient consult to Orthopedic Surgery  Consult performed by: Joce Blake MD  Consult ordered by: Rivera Moon PA-C        Subjective:     Principal Problem:Septic bursitis of elbow, right    Chief Complaint:   Chief Complaint   Patient presents with    Joint Swelling     Right elbow pain x2 days. States "doctor tried draining it and told me to come here." +2 radial pulse.         HPI: Kong Lamb is a 52 y.o. male with PMH significant for RLE cellulitis and diverticulitis presenting with right elbow pain. He reports that he has ongoing right elbow swelling at the posterior aspect of his olecranon for two weeks. This was previously drained by an outside provider but he then had increasing pain today and presented to his PCP. His PCP drained the abscess again and the patient reports that a large amount of fluid was removed. He was then instructed to present to the ED. Patient denies numbness and tingling. Denies any other musculoskeletal pain or injuries. No known history of prior right elbow injury or surgery. He reports a previous history of edema and inflammation to his left elbow which he removed on his own. He is right hand dominant.   Doesn't take any anticoagulation at baseline.         Past Medical History:   Diagnosis Date    Cellulitis        Past Surgical History:   Procedure Laterality Date    CHOLECYSTECTOMY         Review of patient's allergies indicates:   Allergen Reactions    Pcn [penicillins] Anaphylaxis    Pcn [penicillins]        Current Facility-Administered Medications   Medication    acetaminophen tablet 1,000 mg    albuterol-ipratropium 2.5 mg-0.5 mg/3 mL nebulizer solution 3 mL    aluminum-magnesium hydroxide-simethicone 200-200-20 mg/5 " "mL suspension 30 mL    aztreonam 1 g in dextrose 5 % 50 mL IVPB (ready to mix system)    [START ON 8/2/2022] aztreonam 1 g in dextrose 5 % 50 mL IVPB (ready to mix system)    dextrose 10% bolus 125 mL    dextrose 10% bolus 250 mL    glucagon (human recombinant) injection 1 mg    glucose chewable tablet 16 g    glucose chewable tablet 24 g    iohexoL (OMNIPAQUE 350) injection 100 mL    melatonin tablet 6 mg    naloxone 0.4 mg/mL injection 0.02 mg    ondansetron injection 4 mg    oxyCODONE immediate release tablet 5 mg    senna-docusate 8.6-50 mg per tablet 1 tablet    sodium chloride 0.9% flush 10 mL    vancomycin - pharmacy to dose    [START ON 8/2/2022] vancomycin 1.5 g in dextrose 5 % 250 mL IVPB (ready to mix)     Family History    None       Tobacco Use    Smoking status: Never Smoker    Smokeless tobacco: Never Used   Substance and Sexual Activity    Alcohol use: No    Drug use: No    Sexual activity: Not on file     ROS  Constitutional: negative for fevers or chills  Eyes: negative visual changes or eye discharge  ENT: negative for ear pain or sore throat  Respiratory: negative for shortness of breath or cough  Cardiovascular: negative for chest pain or palpitations  Gastrointestinal: negative for abdominal pain, nausea, or vomiting  Genitourinary: negative for dysuria and flank pain  Neurological: negative for headaches or dizziness  Musculoskeletal: positive for right elbow pain    Objective:     Vital Signs (Most Recent):  Temp: 98 °F (36.7 °C) (08/01/22 2204)  Pulse: 76 (08/01/22 2204)  Resp: 20 (08/01/22 2204)  BP: 138/84 (08/01/22 2204)  SpO2: 96 % (08/01/22 2204) Vital Signs (24h Range):  Temp:  [97.5 °F (36.4 °C)-98 °F (36.7 °C)] 98 °F (36.7 °C)  Pulse:  [70-82] 76  Resp:  [18-20] 20  SpO2:  [96 %-99 %] 96 %  BP: (138-153)/(81-90) 138/84     Weight: 102.1 kg (225 lb)  Height: 5' 11" (180.3 cm)  Body mass index is 31.38 kg/m².      Intake/Output Summary (Last 24 hours) at 8/1/2022 " "2303  Last data filed at 8/1/2022 1908  Gross per 24 hour   Intake 1000 ml   Output --   Net 1000 ml       Ortho/SPM Exam  Gen:  No acute distress, well-developed, well nourished.  CV:  Peripherally well-perfused. 2+ radial pulses, symmetric.  Respiratory:  Normal respiratory effort. No accessory muscle use.   Head/Neck:  Normocephalic.  Atraumatic. Sclera anicteric. TM. Neck supple.  Neuro: CN 2-12 grossly intact. No FND. Awake. Alert. Oriented to person, place, time, and situation.  Abdomen: Soft, NTND.      MSK:  Right Upper extremity  Inspection  - Previous small incisions at site of aspirations to the skin overlying the olecranon with small area of ecchymosis  - Edema and warmth to olecranon bursa  Palpation  - TTP to olecranon and distal posterior arm  Range of motion  - AROM and PROM of the shoulder, elbow, wrist, and hand intact, pain with flexion and extension of the arm, no pain with pronation and supination of the arm  Stability  - No evidence of joint dislocation or abnormal laxity  Neurovascular  - AIN/PIN/Radial/Median/Ulnar Nerves assessed in isolation without deficit  - Able to give thumbs up, make "OK" sign, cross IF/LF, abduct/adduct fingers, make fist  - SILT throughout  - Compartments soft  - Radial artery palpated  - Muscle tone normal      Significant Labs: CBC:   Recent Labs   Lab 08/01/22  1659   WBC 17.68*   HGB 16.4   HCT 47.1        CMP:   Recent Labs   Lab 08/01/22  1659      K 4.0      CO2 22*   *   BUN 18   CREATININE 1.1   CALCIUM 9.3   PROT 7.1   ALBUMIN 4.0   BILITOT 0.5   ALKPHOS 84   AST 75*   ALT 64*   ANIONGAP 11     CRP:   Recent Labs   Lab 08/01/22  1659   CRP 6.5     All pertinent labs within the past 24 hours have been reviewed.    Significant Imaging: X-Ray: I have reviewed all pertinent results/findings and my personal findings are:  no acute fracture or dislocation      Assessment/Plan:     * Septic bursitis of elbow, right  Kong Zainab is a 52 " y.o. male with spetic olecranon bursitis of the right elbow. Orthopedics initially consulted for concern for compartment syndrome due to edema vs septic joint.     - No concern for acute compartment syndrome, all compartments were soft and compressible, recommend ACE wrap around the entire arm for edema control  - No concern for septic arthritis at this time, pain on flexion and extension of the arm is due to irration at olecranon bursa rather than joint as evidenced by painless pronation and supination of the arm, CRP and ESR WNL  - Olecranon bursa aspirated at bedside, sent for culture and cell count to assist with tailoring of Abx   - WBAT RUE, ROMAT RUE  - Recommend empiric IV abx for progressive infection  - Orthopedics will continue to follow        Joce Blake MD  Orthopedics  [facility] - Tele[facility]

## 2022-08-02 NOTE — ASSESSMENT & PLAN NOTE
Right elbow septic bursitis.  Cultures from bursa (not joint space, clarified with ortho) NGTD.  Will contact Jerson Barragan/Dr Cook for prior drainage culture results.  Would not challenge with PCN for now.    Recommendations:  -Continue vancomycin  -Change aztreonam to levofloxacin  -Ortho consulted; recs appreciated  -Will follow-up culture results

## 2022-08-02 NOTE — SUBJECTIVE & OBJECTIVE
Interval History: Admitted overnight.  This morning endorses improved pain.  Discussed waiting on cultures and ID consult.    Review of Systems   Constitutional:  Negative for chills, fatigue and fever.   Respiratory:  Negative for cough and shortness of breath.    Cardiovascular:  Negative for chest pain, palpitations and leg swelling.   Gastrointestinal:  Negative for abdominal pain, diarrhea, nausea and vomiting.   Genitourinary:  Negative for dysuria and urgency.   Musculoskeletal:  Positive for joint swelling.   Neurological:  Negative for dizziness and headaches.   All other systems reviewed and are negative.  Objective:     Vital Signs (Most Recent):  Temp: 97.6 °F (36.4 °C) (08/02/22 1159)  Pulse: 72 (08/02/22 1159)  Resp: 20 (08/02/22 1159)  BP: 128/74 (08/02/22 1159)  SpO2: 97 % (08/02/22 1159) Vital Signs (24h Range):  Temp:  [97.5 °F (36.4 °C)-98 °F (36.7 °C)] 97.6 °F (36.4 °C)  Pulse:  [63-82] 72  Resp:  [16-20] 20  SpO2:  [95 %-99 %] 97 %  BP: (119-153)/(60-90) 128/74     Weight: 102.1 kg (225 lb)  Body mass index is 31.38 kg/m².    Intake/Output Summary (Last 24 hours) at 8/2/2022 1311  Last data filed at 8/1/2022 1908  Gross per 24 hour   Intake 1000 ml   Output --   Net 1000 ml      Physical Exam  Constitutional:       Appearance: Normal appearance. He is well-developed. He is obese.   HENT:      Head: Normocephalic and atraumatic.   Cardiovascular:      Rate and Rhythm: Normal rate and regular rhythm.      Heart sounds: No murmur heard.  Pulmonary:      Effort: Pulmonary effort is normal. No respiratory distress.      Breath sounds: Normal breath sounds. No wheezing or rales.   Abdominal:      General: There is no distension.      Palpations: Abdomen is soft.      Tenderness: There is no abdominal tenderness.   Musculoskeletal:         General: Swelling (Right elbow, wrapped) present. No deformity.   Skin:     General: Skin is warm.   Neurological:      General: No focal deficit present.       Mental Status: He is alert and oriented to person, place, and time. Mental status is at baseline.       Significant Labs: All pertinent labs within the past 24 hours have been reviewed.  CBC:   Recent Labs   Lab 08/01/22  1659 08/02/22  0257   WBC 17.68* 11.94   HGB 16.4 14.3   HCT 47.1 42.0    205     CMP:   Recent Labs   Lab 08/01/22  1659 08/02/22  0257    137   K 4.0 3.9    104   CO2 22* 24   * 99   BUN 18 17   CREATININE 1.1 1.0   CALCIUM 9.3 8.8   PROT 7.1 5.6*   ALBUMIN 4.0 3.2*   BILITOT 0.5 0.6   ALKPHOS 84 71   AST 75* 54*   ALT 64* 55*   ANIONGAP 11 9       Significant Imaging: I have reviewed all pertinent imaging results/findings within the past 24 hours.

## 2022-08-02 NOTE — SUBJECTIVE & OBJECTIVE
Past Medical History:   Diagnosis Date    Cellulitis        Past Surgical History:   Procedure Laterality Date    CHOLECYSTECTOMY         Review of patient's allergies indicates:   Allergen Reactions    Pcn [penicillins] Anaphylaxis    Pcn [penicillins]        Current Facility-Administered Medications   Medication    acetaminophen tablet 1,000 mg    albuterol-ipratropium 2.5 mg-0.5 mg/3 mL nebulizer solution 3 mL    aluminum-magnesium hydroxide-simethicone 200-200-20 mg/5 mL suspension 30 mL    aztreonam 1 g in dextrose 5 % 50 mL IVPB (ready to mix system)    [START ON 8/2/2022] aztreonam 1 g in dextrose 5 % 50 mL IVPB (ready to mix system)    dextrose 10% bolus 125 mL    dextrose 10% bolus 250 mL    glucagon (human recombinant) injection 1 mg    glucose chewable tablet 16 g    glucose chewable tablet 24 g    iohexoL (OMNIPAQUE 350) injection 100 mL    melatonin tablet 6 mg    naloxone 0.4 mg/mL injection 0.02 mg    ondansetron injection 4 mg    oxyCODONE immediate release tablet 5 mg    senna-docusate 8.6-50 mg per tablet 1 tablet    sodium chloride 0.9% flush 10 mL    vancomycin - pharmacy to dose    [START ON 8/2/2022] vancomycin 1.5 g in dextrose 5 % 250 mL IVPB (ready to mix)     Family History    None       Tobacco Use    Smoking status: Never Smoker    Smokeless tobacco: Never Used   Substance and Sexual Activity    Alcohol use: No    Drug use: No    Sexual activity: Not on file     ROS  Constitutional: negative for fevers or chills  Eyes: negative visual changes or eye discharge  ENT: negative for ear pain or sore throat  Respiratory: negative for shortness of breath or cough  Cardiovascular: negative for chest pain or palpitations  Gastrointestinal: negative for abdominal pain, nausea, or vomiting  Genitourinary: negative for dysuria and flank pain  Neurological: negative for headaches or dizziness  Musculoskeletal: positive for right elbow pain    Objective:     Vital Signs (Most Recent):  Temp: 98 °F  "(36.7 °C) (08/01/22 2204)  Pulse: 76 (08/01/22 2204)  Resp: 20 (08/01/22 2204)  BP: 138/84 (08/01/22 2204)  SpO2: 96 % (08/01/22 2204) Vital Signs (24h Range):  Temp:  [97.5 °F (36.4 °C)-98 °F (36.7 °C)] 98 °F (36.7 °C)  Pulse:  [70-82] 76  Resp:  [18-20] 20  SpO2:  [96 %-99 %] 96 %  BP: (138-153)/(81-90) 138/84     Weight: 102.1 kg (225 lb)  Height: 5' 11" (180.3 cm)  Body mass index is 31.38 kg/m².      Intake/Output Summary (Last 24 hours) at 8/1/2022 2303  Last data filed at 8/1/2022 1908  Gross per 24 hour   Intake 1000 ml   Output --   Net 1000 ml       Ortho/SPM Exam  Gen:  No acute distress, well-developed, well nourished.  CV:  Peripherally well-perfused. 2+ radial pulses, symmetric.  Respiratory:  Normal respiratory effort. No accessory muscle use.   Head/Neck:  Normocephalic.  Atraumatic. Sclera anicteric. TM. Neck supple.  Neuro: CN 2-12 grossly intact. No FND. Awake. Alert. Oriented to person, place, time, and situation.  Abdomen: Soft, NTND.      MSK:  Right Upper extremity  Inspection  - Previous small incisions at site of aspirations to the skin overlying the olecranon with small area of ecchymosis  - Edema and warmth to olecranon bursa  Palpation  - TTP to olecranon and distal posterior arm  Range of motion  - AROM and PROM of the shoulder, elbow, wrist, and hand intact, pain with flexion and extension of the arm, no pain with pronation and supination of the arm  Stability  - No evidence of joint dislocation or abnormal laxity  Neurovascular  - AIN/PIN/Radial/Median/Ulnar Nerves assessed in isolation without deficit  - Able to give thumbs up, make "OK" sign, cross IF/LF, abduct/adduct fingers, make fist  - SILT throughout  - Compartments soft  - Radial artery palpated  - Muscle tone normal      Significant Labs: CBC:   Recent Labs   Lab 08/01/22  1659   WBC 17.68*   HGB 16.4   HCT 47.1        CMP:   Recent Labs   Lab 08/01/22  1659      K 4.0      CO2 22*   *   BUN 18 "   CREATININE 1.1   CALCIUM 9.3   PROT 7.1   ALBUMIN 4.0   BILITOT 0.5   ALKPHOS 84   AST 75*   ALT 64*   ANIONGAP 11     CRP:   Recent Labs   Lab 08/01/22  1659   CRP 6.5     All pertinent labs within the past 24 hours have been reviewed.    Significant Imaging: X-Ray: I have reviewed all pertinent results/findings and my personal findings are:  no acute fracture or dislocation

## 2022-08-02 NOTE — PROGRESS NOTES
"[facility] - [facility]  Orthopedics  Progress Note    Patient Name: Kong Lamb  MRN: [id number]  Admission Date: 8/1/2022  Hospital Length of Stay: 1 days  Attending Provider: Janet Jeffries MD  Primary Care Provider: Primary Doctor No    Subjective:     Principal Problem:Septic bursitis of elbow, right    Principal Orthopedic Problem: Same    Interval History: Pt seen and examined at bedside. NAEON, VSS, AF. Pain controlled. Reports that his edema has decreased and his pain is improving.      Review of patient's allergies indicates:   Allergen Reactions    Pcn [penicillins] Anaphylaxis    Pcn [penicillins]        Current Facility-Administered Medications   Medication    acetaminophen tablet 1,000 mg    albuterol-ipratropium 2.5 mg-0.5 mg/3 mL nebulizer solution 3 mL    aluminum-magnesium hydroxide-simethicone 200-200-20 mg/5 mL suspension 30 mL    aztreonam 1 g in dextrose 5 % 50 mL IVPB (ready to mix system)    dextrose 10% bolus 125 mL    dextrose 10% bolus 250 mL    glucagon (human recombinant) injection 1 mg    glucose chewable tablet 16 g    glucose chewable tablet 24 g    iohexoL (OMNIPAQUE 350) injection 100 mL    melatonin tablet 6 mg    naloxone 0.4 mg/mL injection 0.02 mg    ondansetron injection 4 mg    oxyCODONE immediate release tablet 5 mg    senna-docusate 8.6-50 mg per tablet 1 tablet    sodium chloride 0.9% flush 10 mL    vancomycin - pharmacy to dose    vancomycin 1.5 g in dextrose 5 % 250 mL IVPB (ready to mix)     Objective:     Vital Signs (Most Recent):  Temp: 97.7 °F (36.5 °C) (08/02/22 0746)  Pulse: 68 (08/02/22 0746)  Resp: 16 (08/02/22 0746)  BP: 119/65 (08/02/22 0746)  SpO2: 98 % (08/02/22 0746) Vital Signs (24h Range):  Temp:  [97.5 °F (36.4 °C)-98 °F (36.7 °C)] 97.7 °F (36.5 °C)  Pulse:  [63-82] 68  Resp:  [16-20] 16  SpO2:  [95 %-99 %] 98 %  BP: (119-153)/(60-90) 119/65     Weight: 102.1 kg (225 lb)  Height: 5' 11" (180.3 cm)  Body mass index is 31.38 " "kg/m².      Intake/Output Summary (Last 24 hours) at 8/2/2022 0801  Last data filed at 8/1/2022 1908  Gross per 24 hour   Intake 1000 ml   Output --   Net 1000 ml       Ortho/SPM Exam  Right Upper extremity  Inspection  - Previous small incisions at site of aspirations to the skin overlying the olecranon with small area of ecchymosis  - Edema to olecranon bursa, decreasing  Palpation  - TTP to olecranon and distal posterior arm  Range of motion  - AROM and PROM of the shoulder, elbow, wrist, and hand intact, pain with flexion and extension of the arm decreased from previous exam but with increasing ROM, no pain with pronation and supination of the arm  Stability  - No evidence of joint dislocation or abnormal laxity  Neurovascular  - AIN/PIN/Radial/Median/Ulnar Nerves assessed in isolation without deficit  - Able to give thumbs up, make "OK" sign, cross IF/LF, abduct/adduct fingers, make fist  - SILT throughout  - Compartments soft  - Radial artery palpated  - Muscle tone normal    Significant Labs: CBC:   Recent Labs   Lab 08/01/22 1659 08/02/22  0257   WBC 17.68* 11.94   HGB 16.4 14.3   HCT 47.1 42.0    205     CMP:   Recent Labs   Lab 08/01/22  1659 08/02/22  0257    137   K 4.0 3.9    104   CO2 22* 24   * 99   BUN 18 17   CREATININE 1.1 1.0   CALCIUM 9.3 8.8   PROT 7.1 5.6*   ALBUMIN 4.0 3.2*   BILITOT 0.5 0.6   ALKPHOS 84 71   AST 75* 54*   ALT 64* 55*   ANIONGAP 11 9     All pertinent labs within the past 24 hours have been reviewed.    Significant Imaging: I have reviewed all pertinent imaging results/findings.    Assessment/Plan:     * Septic bursitis of elbow, right  Kong Lamb is a 52 y.o. male with spetic olecranon bursitis of the right elbow. Orthopedics initially consulted for concern for compartment syndrome due to edema vs septic joint.     - No concern for acute compartment syndrome, all compartments were soft and compressible, recommend ACE wrap around the entire arm for " edema control  - No concern for septic arthritis at this time, pain on flexion and extension of the arm is due to irration at olecranon bursa rather than joint as evidenced by painless pronation and supination of the arm, CRP and ESR WNL  - Olecranon bursa aspirated at bedside, sent for culture and cell count to assist with tailoring of Abx, NGTD, cell count consistent with hematoma aspiration, suspect due to previous aspiration by PCP guido watersild  - WBAT RUE, ROMZACH RUE  - Recommend empiric IV abx for progressive infection  - Orthopedics will continue to follow          Joce Blake MD  Orthopedics  [facility]

## 2022-08-02 NOTE — PROGRESS NOTES
Pharmacokinetic Initial Assessment: IV Vancomycin    Assessment/Plan:    Initiate intravenous vancomycin with loading dose of 2000 mg once, done in ED, followed by a maintenance dose of vancomycin 1500 mg IV every 12 hours.  Desired empiric serum trough concentration is 15 to 20 mcg/mL.  Draw vancomycin trough level 60 min prior to fourth dose on 08/03/2022 at 0800.  Pharmacy will continue to follow and monitor vancomycin.      Please contact pharmacy at extension [phone] with any questions regarding this assessment.     Thank you for the consult,   Candido Hancock       Patient brief summary:  Kong Lamb is a 52 y.o. male initiated on antimicrobial therapy with IV Vancomycin for treatment of suspected bone/joint infection.    Drug Allergies:   Review of patient's allergies indicates:   Allergen Reactions    Pcn [penicillins] Anaphylaxis    Pcn [penicillins]        Actual Body Weight:   102.1 kg    Renal Function:   Estimated Creatinine Clearance: 95.6 mL/min (based on SCr of 1.1 mg/dL).    CBC (last 72 hours):  Recent Labs   Lab Result Units 08/01/22  1659   WBC K/uL 17.68*   Hemoglobin g/dL 16.4   Hematocrit % 47.1   Platelets K/uL 255   Gran % % 77.3*   Lymph % % 13.7*   Mono % % 7.2   Eosinophil % % 1.0   Basophil % % 0.2   Differential Method  Automated       Metabolic Panel (last 72 hours):  Recent Labs   Lab Result Units 08/01/22  1659 08/01/22  1828   Sodium mmol/L 136  --    Potassium mmol/L 4.0  --    Chloride mmol/L 103  --    CO2 mmol/L 22*  --    Glucose mg/dL 115*  --    Glucose, UA   --  Negative   BUN mg/dL 18  --    Creatinine mg/dL 1.1  --    Albumin g/dL 4.0  --    Total Bilirubin mg/dL 0.5  --    Alkaline Phosphatase U/L 84  --    AST U/L 75*  --    ALT U/L 64*  --        Drug levels (last 3 results):  No results for input(s): VANCOMYCINRA, VANCORANDOM, VANCOMYCINPE, VANCOPEAK, VANCOMYCINTR, VANCOTROUGH in the last 72 hours.    Microbiologic Results:  Microbiology Results (last 7 days)      Procedure Component Value Units Date/Time    Blood culture [[id number]]     Order Status: Sent Specimen: Blood     Blood culture [[id number]]     Order Status: Sent Specimen: Blood     AFB Culture & Smear [[id number]] Collected: 08/01/22 2000    Order Status: Sent Specimen: Body Fluid from Elbow, Right Updated: 08/01/22 2030    Fungus culture [[id number]] Collected: 08/01/22 2000    Order Status: Sent Specimen: Body Fluid from Elbow, Right Updated: 08/01/22 2029    Gram stain [[id number]] Collected: 08/01/22 2000    Order Status: Sent Specimen: Body Fluid from Elbow, Right Updated: 08/01/22 2028    Culture, Anaerobic [[id number]] Collected: 08/01/22 2000    Order Status: Sent Specimen: Body Fluid from Elbow, Right Updated: 08/01/22 2028    Aerobic culture [[id number]] Collected: 08/01/22 2001    Order Status: Sent Specimen: Skin from Elbow, Right Updated: 08/01/22 2027

## 2022-08-02 NOTE — ASSESSMENT & PLAN NOTE
· Xray Posterior distal right upper arm and elbow localized soft tissue swelling/contusion without acute osseous process seen.   · CT elbow with soft tissue edema  · Ortho consulted  · S/p Ortho aspiration on 8/1  · Cx NGTD  · WBC normalized  · Continue Vanc and Aztreonam.  ID consulted  · Pain control with prn Oxy IR and IV Morphine

## 2022-08-02 NOTE — HPI
"Kong Lamb is a 52 y.o. male with medical history of RLE cellulitis, Diverticulitis presenting to the ED with the chief complaint of joint swelling. Patient reports chronic BL elbow swelling and pain that he contributes to "bumping" his elbow during his work as a . Reports having his elbows drained previously. Reports having R elbow swelling for the past week and was treated with an unknown antibiotic. Reports acutely worsening R elbow swelling and pain today. He was seen by his PCP who drained his elbow bursa. PCP was concerned about the amount of blood that was drained and referred him to the ED for further evaluation. Denies any specific trauma today. Reports having reduced ROM of his R elbow and was able to range his R elbow normally yesterday. No history of RUE surgeries. No blood thinner use. Reports marijuana use. Denies other recreational drug use.     In the ED patient afebrile and hemodynamically stable saturating well on room air. WBC 17.68. CPK 1535. ESR and CRP wnl. Orthopedic surgery consulted and evaluated patient. Ortho consulted and suspect infected bursa with a degree of myositis. CT elbow pending. Ortho performed arthrocentesis at bedside. Vanc and Aztreonam ordered. Patient admitted to the care of medicine for further evaluation and management.  "

## 2022-08-02 NOTE — HPI
52-year-old male presents for right olecranon bursitis.  Works as a  , and has complained of prior swollen elbows in the past, including self-draining the left elbow in the past (nontender, full ROM this admission).  Has gone to his PCP Dr. Theodore Cook of [facility] regarding recurrent right elbow pain and swelling.  The PCP was able to incise and drain the right elbow twice and prescribed antibiotics with interval resolution each time (patient cannot recall antibiotics).  The third occurrence his PCP advised him to present to the ED.  Clarified with [facility] orthopedic team here; bedside I&D done of olecranon bursa; Cx's not from the joint space.    Report PCN allergy, occurred when he was 6 y/o during tonsillitis/tonsillectomy resulting in anaphylaxis.  Also reports PCN allergy runs in his family.  Patient works as a .  Multiple tattoos, last one [place] 2010 [place].  No ETOH/smoking/IVDU.

## 2022-08-02 NOTE — SUBJECTIVE & OBJECTIVE
Past Medical History:   Diagnosis Date    Cellulitis        Past Surgical History:   Procedure Laterality Date    CHOLECYSTECTOMY         Review of patient's allergies indicates:   Allergen Reactions    Pcn [penicillins] Anaphylaxis    Pcn [penicillins]        No current facility-administered medications on file prior to encounter.     Current Outpatient Medications on File Prior to Encounter   Medication Sig    diazePAM (VALIUM) 5 MG tablet Take 1 tablet (5 mg total) by mouth every 6 (six) hours as needed (muscle spasm).    HYDROcodone-acetaminophen (NORCO) 5-325 mg per tablet Take 1 tablet by mouth every 6 (six) hours as needed.     Family History    None       Tobacco Use    Smoking status: Never Smoker    Smokeless tobacco: Never Used   Substance and Sexual Activity    Alcohol use: No    Drug use: No    Sexual activity: Not on file     Review of Systems   Constitutional:  Negative for chills, fatigue and fever.   HENT:  Negative for sore throat and trouble swallowing.    Eyes:  Negative for photophobia and visual disturbance.   Respiratory:  Negative for cough, shortness of breath and wheezing.    Cardiovascular:  Negative for chest pain, palpitations and leg swelling.   Gastrointestinal:  Negative for abdominal distention, abdominal pain, diarrhea, nausea and vomiting.   Genitourinary:  Negative for dysuria and hematuria.   Musculoskeletal:  Positive for arthralgias and joint swelling. Negative for neck pain and neck stiffness.   Skin:  Negative for rash and wound.   Neurological:  Negative for seizures, syncope, weakness, light-headedness and headaches.   Psychiatric/Behavioral:  Negative for confusion and decreased concentration.    Objective:     Vital Signs (Most Recent):  Temp: 98 °F (36.7 °C) (08/01/22 2204)  Pulse: 76 (08/01/22 2204)  Resp: 20 (08/01/22 2204)  BP: 138/84 (08/01/22 2204)  SpO2: 96 % (08/01/22 2204) Vital Signs (24h Range):  Temp:  [97.5 °F (36.4 °C)-98 °F (36.7 °C)] 98 °F (36.7  °C)  Pulse:  [70-82] 76  Resp:  [18-20] 20  SpO2:  [96 %-99 %] 96 %  BP: (138-153)/(81-90) 138/84     Weight: 102.1 kg (225 lb)  Body mass index is 31.38 kg/m².    Physical Exam  Constitutional:       General: He is not in acute distress.     Appearance: He is not toxic-appearing or diaphoretic.   HENT:      Head: Normocephalic and atraumatic.      Nose: Nose normal.   Eyes:      General: No scleral icterus.     Extraocular Movements: Extraocular movements intact.      Pupils: Pupils are equal, round, and reactive to light.   Cardiovascular:      Rate and Rhythm: Normal rate and regular rhythm.   Pulmonary:      Effort: Pulmonary effort is normal. No respiratory distress.      Breath sounds: No wheezing or rales.   Abdominal:      General: Abdomen is flat. There is no distension.      Palpations: Abdomen is soft.      Tenderness: There is no abdominal tenderness. There is no guarding.   Musculoskeletal:      Cervical back: Normal range of motion and neck supple. No rigidity.      Right lower leg: No edema.      Left lower leg: No edema.      Comments: RUE - edema surrounding elbow and elbow held in partial extension with limited ROM 2/2 pain. Wrapped in compressive dressing and dressings clean dry and intact. Distal pulses intact. Sensation intact.   Skin:     General: Skin is warm and dry.      Coloration: Skin is not jaundiced.   Neurological:      General: No focal deficit present.      Mental Status: He is alert and oriented to person, place, and time.      Cranial Nerves: No cranial nerve deficit.   Psychiatric:         Mood and Affect: Mood normal.         Behavior: Behavior normal.         CRANIAL NERVES     CN III, IV, VI   Pupils are equal, round, and reactive to light.     Significant Labs: All pertinent labs within the past 24 hours have been reviewed.  CBC:   Recent Labs   Lab 08/01/22  1659   WBC 17.68*   HGB 16.4   HCT 47.1        CMP:   Recent Labs   Lab 08/01/22  1659      K 4.0       CO2 22*   *   BUN 18   CREATININE 1.1   CALCIUM 9.3   PROT 7.1   ALBUMIN 4.0   BILITOT 0.5   ALKPHOS 84   AST 75*   ALT 64*   ANIONGAP 11       Significant Imaging: I have reviewed all pertinent imaging results/findings within the past 24 hours.

## 2022-08-02 NOTE — SUBJECTIVE & OBJECTIVE
"Past Medical History:   Diagnosis Date    Cellulitis        Past Surgical History:   Procedure Laterality Date    CHOLECYSTECTOMY         Review of patient's allergies indicates:   Allergen Reactions    Pcn [penicillins] Anaphylaxis    Pcn [penicillins]        Medications:  Medications Prior to Admission   Medication Sig    diazePAM (VALIUM) 5 MG tablet Take 1 tablet (5 mg total) by mouth every 6 (six) hours as needed (muscle spasm).    HYDROcodone-acetaminophen (NORCO) 5-325 mg per tablet Take 1 tablet by mouth every 6 (six) hours as needed.     Antibiotics (From admission, onward)                Start     Stop Route Frequency Ordered    08/02/22 1430  aztreonam 2 g in dextrose 5 % 100 mL IVPB (ready to mix system)         -- IV Every 8 hours (non-standard times) 08/02/22 1127    08/02/22 0900  vancomycin 1.5 g in dextrose 5 % 250 mL IVPB (ready to mix)         -- IV Every 12 hours (non-standard times) 08/01/22 2151    08/01/22 2204  vancomycin - pharmacy to dose  (vancomycin IVPB)        "And" Linked Group Details    -- IV pharmacy to manage frequency 08/01/22 2105          Antifungals (From admission, onward)                None          Antivirals (From admission, onward)      None               There is no immunization history on file for this patient.    Family History    None       Social History     Socioeconomic History    Marital status: Significant Other   Tobacco Use    Smoking status: Never Smoker    Smokeless tobacco: Never Used   Substance and Sexual Activity    Alcohol use: No    Drug use: No   Social History Narrative    ** Merged History Encounter **          Review of Systems   Constitutional:  Negative for chills and fever.   HENT:  Negative for facial swelling and sore throat.    Respiratory:  Negative for cough and shortness of breath.    Cardiovascular:  Negative for chest pain.   Gastrointestinal:  Negative for abdominal pain, diarrhea, nausea and vomiting.   Musculoskeletal:  Positive for " joint swelling.   Skin:  Negative for wound.   Neurological:  Negative for headaches.   Psychiatric/Behavioral:  Negative for agitation, behavioral problems, confusion and decreased concentration.    Objective:     Vital Signs (Most Recent):  Temp: 97.7 °F (36.5 °C) (08/02/22 1612)  Pulse: 60 (08/02/22 1612)  Resp: 20 (08/02/22 1612)  BP: 111/66 (08/02/22 1612)  SpO2: 96 % (08/02/22 1612) Vital Signs (24h Range):  Temp:  [97.5 °F (36.4 °C)-98 °F (36.7 °C)] 97.7 °F (36.5 °C)  Pulse:  [60-76] 60  Resp:  [16-20] 20  SpO2:  [95 %-99 %] 96 %  BP: (111-146)/(60-84) 111/66     Weight: 102.1 kg (225 lb)  Body mass index is 31.38 kg/m².    Estimated Creatinine Clearance: 105.1 mL/min (based on SCr of 1 mg/dL).    Physical Exam  Vitals reviewed.   Constitutional:       General: He is not in acute distress.     Appearance: He is normal weight. He is not ill-appearing, toxic-appearing or diaphoretic.   HENT:      Head: Normocephalic and atraumatic.      Right Ear: External ear normal.      Left Ear: External ear normal.      Nose: Nose normal.   Eyes:      General: No scleral icterus.        Right eye: No discharge.         Left eye: No discharge.      Extraocular Movements: Extraocular movements intact.      Conjunctiva/sclera: Conjunctivae normal.   Cardiovascular:      Rate and Rhythm: Normal rate and regular rhythm.      Heart sounds: Normal heart sounds. No murmur heard.  Pulmonary:      Effort: Pulmonary effort is normal. No respiratory distress.      Breath sounds: Normal breath sounds.   Abdominal:      General: Abdomen is flat.      Palpations: Abdomen is soft.      Tenderness: There is no abdominal tenderness.   Musculoskeletal:         General: Swelling and signs of injury present. Normal range of motion.      Cervical back: Normal range of motion.      Comments: -right elbow olecranon bursa swollen, full  -elbows full ROM   Skin:     General: Skin is warm and dry.      Coloration: Skin is not jaundiced.       Comments: -multiple tattoos   Neurological:      Mental Status: He is alert and oriented to person, place, and time. Mental status is at baseline.   Psychiatric:         Mood and Affect: Mood normal.         Behavior: Behavior normal.         Thought Content: Thought content normal.         Judgment: Judgment normal.       Significant Labs: All pertinent labs within the past 24 hours have been reviewed.    Significant Imaging: I have reviewed all pertinent imaging results/findings within the past 24 hours.

## 2022-08-02 NOTE — ASSESSMENT & PLAN NOTE
- Orthopedic surgery consulted  ;  Appreciate recs  - sp arthrocentesis in ED  - follow up fluid studies and culture  - follow up blood culture  - continue Vanc and aztreonam  - Xray Posterior distal right upper arm and elbow localized soft tissue swelling/contusion without acute osseous process seen.   - CT elbow pending  - further management pending clinical course and future study review

## 2022-08-02 NOTE — ASSESSMENT & PLAN NOTE
· Body mass index is 31.38 kg/m².   · Morbid obesity complicates all aspects of disease management from diagnostic modalities to treatment.   · Weight loss encouraged and health benefits explained to patient.

## 2022-08-02 NOTE — CONSULTS
[facility]  Infectious Disease  Consult Note    Patient Name: Kong Lamb  MRN: [id number]  Admission Date: 8/1/2022  Hospital Length of Stay: 1 days  Attending Physician: Janet Jeffries MD  Primary Care Provider: Wes Singh MD     Isolation Status: No active isolations    Patient information was obtained from patient and ER records.      Inpatient consult to Infectious Diseases  Consult performed by: Blaise Saavedra MD  Consult ordered by: Janet Jeffries MD        Assessment/Plan:     * Septic bursitis of elbow, right  Right elbow septic bursitis.  Cultures from bursa (not joint space, clarified with ortho) NGTD.  Will contact [facility]/Dr Cook for prior drainage culture results.  Would not challenge with PCN for now.    Recommendations:  -Continue vancomycin  -Change aztreonam to levofloxacin  -Ortho consulted; recs appreciated  -Will follow-up culture results        Thank you for your consult. I will follow-up with patient. Please contact us if you have any additional questions.    Blaise Saavedra MD  Infectious Disease  [facility]    Subjective:     Principal Problem: Septic bursitis of elbow, right    HPI: 52-year-old male presents for right olecranon bursitis.  Works as a  , and has complained of prior swollen elbows in the past, including self-draining the left elbow in the past (nontender, full ROM this admission).  Has gone to his PCP Dr. Theodore Cook of [facility] regarding recurrent right elbow pain and swelling.  The PCP was able to incise and drain the right elbow twice and prescribed antibiotics with interval resolution each time (patient cannot recall antibiotics).  The third occurrence his PCP advised him to present to the ED.  [place]ar[place] [facility] [place]; bedside I&D done of olecranon bursa; Cx's not from the joint space.    Report PCN allergy, occurred when he was 6 y/o during tonsillitis/tonsillectomy resulting in anaphylaxis.  Also  "reports PCN allergy runs in his family.  Patient works as a .  Multiple tattoos, last one received 2010 in [place].  No ETOH/smoking/IVDU.      Past Medical History:   Diagnosis Date    Cellulitis        Past Surgical History:   Procedure Laterality Date    CHOLECYSTECTOMY         Review of patient's allergies indicates:   Allergen Reactions    Pcn [penicillins] Anaphylaxis    Pcn [penicillins]        Medications:  Medications Prior to Admission   Medication Sig    diazePAM (VALIUM) 5 MG tablet Take 1 tablet (5 mg total) by mouth every 6 (six) hours as needed (muscle spasm).    HYDROcodone-acetaminophen (NORCO) 5-325 mg per tablet Take 1 tablet by mouth every 6 (six) hours as needed.     Antibiotics (From admission, onward)                Start     Stop Route Frequency Ordered    08/02/22 1430  aztreonam 2 g in dextrose 5 % 100 mL IVPB (ready to mix system)         -- IV Every 8 hours (non-standard times) 08/02/22 1127    08/02/22 0900  vancomycin 1.5 g in dextrose 5 % 250 mL IVPB (ready to mix)         -- IV Every 12 hours (non-standard times) 08/01/22 2151    08/01/22 2204  vancomycin - pharmacy to dose  (vancomycin IVPB)        "And" Linked Group Details    -- IV pharmacy to manage frequency 08/01/22 2105          Antifungals (From admission, onward)                None          Antivirals (From admission, onward)      None               There is no immunization history on file for this patient.    Family History    None       Social History     Socioeconomic History    Marital status: Significant Other   Tobacco Use    Smoking status: Never Smoker    Smokeless tobacco: Never Used   Substance and Sexual Activity    Alcohol use: No    Drug use: No   Social History Narrative    ** Merged History Encounter **          Review of Systems   Constitutional:  Negative for chills and fever.   HENT:  Negative for facial swelling and sore throat.    Respiratory:  Negative for cough and shortness of breath. "    Cardiovascular:  Negative for chest pain.   Gastrointestinal:  Negative for abdominal pain, diarrhea, nausea and vomiting.   Musculoskeletal:  Positive for joint swelling.   Skin:  Negative for wound.   Neurological:  Negative for headaches.   Psychiatric/Behavioral:  Negative for agitation, behavioral problems, confusion and decreased concentration.    Objective:     Vital Signs (Most Recent):  Temp: 97.7 °F (36.5 °C) (08/02/22 1612)  Pulse: 60 (08/02/22 1612)  Resp: 20 (08/02/22 1612)  BP: 111/66 (08/02/22 1612)  SpO2: 96 % (08/02/22 1612) Vital Signs (24h Range):  Temp:  [97.5 °F (36.4 °C)-98 °F (36.7 °C)] 97.7 °F (36.5 °C)  Pulse:  [60-76] 60  Resp:  [16-20] 20  SpO2:  [95 %-99 %] 96 %  BP: (111-146)/(60-84) 111/66     Weight: 102.1 kg (225 lb)  Body mass index is 31.38 kg/m².    Estimated Creatinine Clearance: 105.1 mL/min (based on SCr of 1 mg/dL).    Physical Exam  Vitals reviewed.   Constitutional:       General: He is not in acute distress.     Appearance: He is normal weight. He is not ill-appearing, toxic-appearing or diaphoretic.   HENT:      Head: Normocephalic and atraumatic.      Right Ear: External ear normal.      Left Ear: External ear normal.      Nose: Nose normal.   Eyes:      General: No scleral icterus.        Right eye: No discharge.         Left eye: No discharge.      Extraocular Movements: Extraocular movements intact.      Conjunctiva/sclera: Conjunctivae normal.   Cardiovascular:      Rate and Rhythm: Normal rate and regular rhythm.      Heart sounds: Normal heart sounds. No murmur heard.  Pulmonary:      Effort: Pulmonary effort is normal. No respiratory distress.      Breath sounds: Normal breath sounds.   Abdominal:      General: Abdomen is flat.      Palpations: Abdomen is soft.      Tenderness: There is no abdominal tenderness.   Musculoskeletal:         General: Swelling and signs of injury present. Normal range of motion.      Cervical back: Normal range of motion.       Comments: -right elbow olecranon bursa swollen  -elbows with full ROM   Skin:     General: Skin is warm and dry.      Coloration: Skin is not jaundiced.      Comments: -multiple tattoos   Neurological:      Mental Status: He is alert and oriented to person, place, and time. Mental status is at baseline.   Psychiatric:         Mood and Affect: Mood normal.         Behavior: Behavior normal.         Thought Content: Thought content normal.         Judgment: Judgment normal.       Significant Labs: All pertinent labs within the past 24 hours have been reviewed.    Significant Imaging: I have reviewed all pertinent imaging results/findings within the past 24 hours.

## 2022-08-02 NOTE — PROGRESS NOTES
Pharmacist Renal Dose Adjustment Note    Kong Lamb is a 52 y.o. male being treated with the medication Aztreonam    Patient Data:    Vital Signs (Most Recent):  Temp: 97.7 °F (36.5 °C) (08/02/22 0746)  Pulse: 68 (08/02/22 0746)  Resp: 18 (08/02/22 1014)  BP: 119/65 (08/02/22 0746)  SpO2: 98 % (08/02/22 0746) Vital Signs (72h Range):  Temp:  [97.5 °F (36.4 °C)-98 °F (36.7 °C)]   Pulse:  [63-82]   Resp:  [16-20]   BP: (119-153)/(60-90)   SpO2:  [95 %-99 %]      Recent Labs   Lab 08/01/22  1659 08/02/22  0257   CREATININE 1.1 1.0     Serum creatinine: 1 mg/dL 08/02/22 0257  Estimated creatinine clearance: 105.1 mL/min    Aztreonam 1g IV Q8h will be changed to Aztreonam 2g IV Q8h    Pharmacist's Name: Julia Yeondam Roh  Pharmacist's Extension: [phone]

## 2022-08-03 VITALS
SYSTOLIC BLOOD PRESSURE: 133 MMHG | RESPIRATION RATE: 18 BRPM | DIASTOLIC BLOOD PRESSURE: 84 MMHG | BODY MASS INDEX: 31.5 KG/M2 | WEIGHT: 225 LBS | OXYGEN SATURATION: 98 % | TEMPERATURE: 98 F | HEART RATE: 73 BPM | HEIGHT: 71 IN

## 2022-08-03 LAB — HCV AB SERPL QL IA: NEGATIVE

## 2022-08-03 PROCEDURE — 99233 PR SUBSEQUENT HOSPITAL CARE,LEVL III: ICD-10-PCS | Mod: ,,, | Performed by: INTERNAL MEDICINE

## 2022-08-03 PROCEDURE — 99239 PR HOSPITAL DISCHARGE DAY,>30 MIN: ICD-10-PCS | Mod: ,,, | Performed by: HOSPITALIST

## 2022-08-03 PROCEDURE — 87081 CULTURE SCREEN ONLY: CPT | Performed by: HOSPITALIST

## 2022-08-03 PROCEDURE — 99239 HOSP IP/OBS DSCHRG MGMT >30: CPT | Mod: ,,, | Performed by: HOSPITALIST

## 2022-08-03 PROCEDURE — 25000003 PHARM REV CODE 250: Performed by: HOSPITALIST

## 2022-08-03 PROCEDURE — 63600175 PHARM REV CODE 636 W HCPCS: Performed by: HOSPITALIST

## 2022-08-03 PROCEDURE — 99233 SBSQ HOSP IP/OBS HIGH 50: CPT | Mod: ,,, | Performed by: INTERNAL MEDICINE

## 2022-08-03 RX ORDER — OXYCODONE HYDROCHLORIDE 20 MG/1
20 TABLET ORAL EVERY 4 HOURS PRN
Qty: 30 TABLET | Refills: 0 | Status: SHIPPED | OUTPATIENT
Start: 2022-08-03

## 2022-08-03 RX ORDER — DOXYCYCLINE 100 MG/1
100 CAPSULE ORAL 2 TIMES DAILY
Qty: 28 CAPSULE | Refills: 0 | Status: SHIPPED | OUTPATIENT
Start: 2022-08-03 | End: 2022-08-17

## 2022-08-03 RX ORDER — OXYCODONE HYDROCHLORIDE 10 MG/1
20 TABLET ORAL EVERY 4 HOURS PRN
Status: DISCONTINUED | OUTPATIENT
Start: 2022-08-03 | End: 2022-08-03 | Stop reason: HOSPADM

## 2022-08-03 RX ORDER — MORPHINE SULFATE 2 MG/ML
10 INJECTION, SOLUTION INTRAMUSCULAR; INTRAVENOUS EVERY 4 HOURS PRN
Status: DISCONTINUED | OUTPATIENT
Start: 2022-08-03 | End: 2022-08-03 | Stop reason: HOSPADM

## 2022-08-03 RX ADMIN — VANCOMYCIN HYDROCHLORIDE 1500 MG: 1.5 INJECTION, POWDER, LYOPHILIZED, FOR SOLUTION INTRAVENOUS at 11:08

## 2022-08-03 RX ADMIN — OXYCODONE HYDROCHLORIDE 10 MG: 10 TABLET ORAL at 07:08

## 2022-08-03 NOTE — PROGRESS NOTES
[facility] - [facility]  Infectious Disease  Progress Note    Patient Name: Kong Lamb  MRN: [id number]  Admission Date: 8/1/2022  Length of Stay: 2 days  Attending Physician: Janet Jeffries MD  Primary Care Provider: Wes Singh MD    Isolation Status: No active isolations  Assessment/Plan:      * Septic bursitis of elbow, right  Right elbow septic bursitis, WBC 5786.  With patient's permission spoke with his PCP Dr. Cook; did not send for cultures during last drainage, was serosanguinous in appearance, and was treated with Bactrim with interval improvement.  Unsure if previously inadequate duration?  Gram stain negative, Cx's from bursa (not joint space, clarified with ortho) NGTD.  At least recommend MRSA nasal swab Cx to give some clues for the future, for now will cover Staph with empiric treatment.    Recommendations:  -Discontinue vancomycin and levofloxacin  -Start doxycycline 100mg BID, recommend 2 weeks of therapy  -MRSA nasal swab Cx  -Allergy outpatient referral regarding penicillin allergy  -With patient's permission, will update his PCP        Thank you for your consult. I will sign off. Please contact us if you have any additional questions.    Blaise Saavedra MD  Infectious Disease  [facility]    Subjective:     Principal Problem:Septic bursitis of elbow, right    HPI: 52-year-old male presents for right olecranon bursitis.  Works as a  , and has complained of prior swollen elbows in the past, including self-draining the left elbow in the past (nontender, full ROM this admission).  Has gone to his PCP Dr. Theodore Cook of [facility] regarding recurrent right elbow pain and swelling.  The PCP was able to incise and drain the right elbow twice and prescribed antibiotics with interval resolution each time (patient cannot recall antibiotics).  The third occurrence his PCP advised him to present to the ED.  Clarified with [facility] [place]opedic team here; bedside I&D done of  olecranon bursa; Cx's not from the joint space.    Report PCN allergy, occurred when he was 6 y/o during tonsillitis/tonsillectomy resulting in anaphylaxis.  Also reports PCN allergy runs in his family.  Patient works as a .  Multiple tattoos, last one received 2010 in [place].  No ETOH/smoking/IVDU.    Interval History: Afebrile, NAEO, gram stain negative, Cx NGTD.    Review of Systems   Constitutional:  Negative for chills and fever.   HENT:  Negative for facial swelling and sore throat.    Respiratory:  Negative for cough and shortness of breath.    Cardiovascular:  Negative for chest pain.   Gastrointestinal:  Negative for abdominal pain, diarrhea, nausea and vomiting.   Musculoskeletal:  Positive for joint swelling.   Skin:  Negative for wound.   Neurological:  Negative for headaches.   Psychiatric/Behavioral:  Negative for agitation, behavioral problems, confusion and decreased concentration.    Objective:     Vital Signs (Most Recent):  Temp: 97.8 °F (36.6 °C) (08/03/22 1214)  Pulse: 73 (08/03/22 1214)  Resp: 18 (08/03/22 1214)  BP: 133/84 (08/03/22 1214)  SpO2: 98 % (08/03/22 1214) Vital Signs (24h Range):  Temp:  [96.3 °F (35.7 °C)-98.2 °F (36.8 °C)] 97.8 °F (36.6 °C)  Pulse:  [60-75] 73  Resp:  [18-22] 18  SpO2:  [94 %-99 %] 98 %  BP: (111-143)/(66-84) 133/84     Weight: 102.1 kg (225 lb)  Body mass index is 31.38 kg/m².    Estimated Creatinine Clearance: 105.1 mL/min (based on SCr of 1 mg/dL).    Physical Exam  Vitals reviewed.   Constitutional:       General: He is not in acute distress.     Appearance: He is normal weight. He is not ill-appearing, toxic-appearing or diaphoretic.   HENT:      Head: Normocephalic and atraumatic.      Right Ear: External ear normal.      Left Ear: External ear normal.      Nose: Nose normal.   Eyes:      General: No scleral icterus.        Right eye: No discharge.         Left eye: No discharge.      Extraocular Movements: Extraocular movements intact.       Conjunctiva/sclera: Conjunctivae normal.   Cardiovascular:      Rate and Rhythm: Normal rate and regular rhythm.      Heart sounds: Normal heart sounds. No murmur heard.  Pulmonary:      Effort: Pulmonary effort is normal. No respiratory distress.      Breath sounds: Normal breath sounds.   Abdominal:      General: Abdomen is flat.      Palpations: Abdomen is soft.      Tenderness: There is no abdominal tenderness.   Musculoskeletal:         General: Swelling and signs of injury present. Normal range of motion.      Cervical back: Normal range of motion.      Comments: -right elbow olecranon bursa swollen, full  -elbows full ROM   Skin:     General: Skin is warm and dry.      Coloration: Skin is not jaundiced.      Comments: -multiple tattoos   Neurological:      Mental Status: He is alert and oriented to person, place, and time. Mental status is at baseline.   Psychiatric:         Mood and Affect: Mood normal.         Behavior: Behavior normal.         Thought Content: Thought content normal.         Judgment: Judgment normal.       Significant Labs: All pertinent labs within the past 24 hours have been reviewed.    Significant Imaging: I have reviewed all pertinent imaging results/findings within the past 24 hours.

## 2022-08-03 NOTE — PROGRESS NOTES
[facility] - Telemetry Stepdown  Orthopedics  Progress Note    Patient Name: Kong Lamb  MRN: [id number]  Admission Date: 8/1/2022  Hospital Length of Stay: 2 days  Attending Provider: Janet Jeffries MD  Primary Care Provider: Wes Singh MD    Subjective:     Principal Problem:Septic bursitis of elbow, right    Principal Orthopedic Problem: Same    Interval History: Patient seen and evaluated at [facility].  Patient reports sleeping poorly due to pain, but says his swelling has significantly improved.  No other concerns this morning.      Review of patient's allergies indicates:   Allergen Reactions    Pcn [penicillins] Anaphylaxis    Pcn [penicillins]        Current Facility-Administered Medications   Medication    acetaminophen tablet 1,000 mg    albuterol-ipratropium 2.5 mg-0.5 mg/3 mL nebulizer solution 3 mL    aluminum-magnesium hydroxide-simethicone 200-200-20 mg/5 mL suspension 30 mL    aztreonam 2 g in dextrose 5 % 100 mL IVPB (ready to mix system)    dextrose 10% bolus 125 mL    dextrose 10% bolus 250 mL    glucagon (human recombinant) injection 1 mg    glucose chewable tablet 16 g    glucose chewable tablet 24 g    iohexoL (OMNIPAQUE 350) injection 100 mL    melatonin tablet 6 mg    morphine injection 6 mg    naloxone 0.4 mg/mL injection 0.02 mg    ondansetron injection 4 mg    oxyCODONE immediate release tablet Tab 10 mg    senna-docusate 8.6-50 mg per tablet 1 tablet    sodium chloride 0.9% flush 10 mL    vancomycin - pharmacy to dose    vancomycin 1.5 g in dextrose 5 % 250 mL IVPB (ready to mix)     Objective:     Vital Signs (Most Recent):  Temp: 96.3 °F (35.7 °C) (08/03/22 0714)  Pulse: 65 (08/03/22 0714)  Resp: 18 (08/03/22 0734)  BP: 131/81 (08/03/22 0714)  SpO2: 97 % (08/03/22 0714) Vital Signs (24h Range):  Temp:  [96.3 °F (35.7 °C)-98.2 °F (36.8 °C)] 96.3 °F (35.7 °C)  Pulse:  [60-75] 65  Resp:  [18-22] 18  SpO2:  [94 %-99 %] 97 %  BP: (111-143)/(66-82) 131/81  "    Weight: 102.1 kg (225 lb)  Height: 5' 11" (180.3 cm)  Body mass index is 31.38 kg/m².      Intake/Output Summary (Last 24 hours) at 8/3/2022 0801  Last data filed at 8/3/2022 0531  Gross per 24 hour   Intake 350 ml   Output 800 ml   Net -450 ml       Ortho/SPM Exam  Right Upper extremity  Inspection  - Previous small incisions at site of aspirations to the skin overlying the olecranon with small area of ecchymosis  - Moderate edema to olecranon bursa.  Palpation  - TTP to olecranon and distal posterior arm  - Compartments soft and compressible   Range of motion  - AROM and PROM of the shoulder, elbow, wrist, and hand intact  - No pain with pronation/supination of arm   - Painful flexion and extension of arm with improved ROM  Stability  - No evidence of joint dislocation or abnormal laxity  Neurovascular  - AIN/PIN/Radial/Median/Ulnar Nerves assessed in isolation without deficit  - Able to give thumbs up, make "OK" sign, cross IF/LF, abduct/adduct fingers, make fist  - SILT throughout  - Compartments soft  - Radial artery palpated  - Muscle tone normal    Significant Labs: CBC:   Recent Labs   Lab 08/01/22  1659 08/02/22  0257   WBC 17.68* 11.94   HGB 16.4 14.3   HCT 47.1 42.0    205     CMP:   Recent Labs   Lab 08/01/22  1659 08/02/22  0257    137   K 4.0 3.9    104   CO2 22* 24   * 99   BUN 18 17   CREATININE 1.1 1.0   CALCIUM 9.3 8.8   PROT 7.1 5.6*   ALBUMIN 4.0 3.2*   BILITOT 0.5 0.6   ALKPHOS 84 71   AST 75* 54*   ALT 64* 55*   ANIONGAP 11 9     All pertinent labs within the past 24 hours have been reviewed.    Significant Imaging: I have reviewed all pertinent imaging results/findings.    Assessment/Plan:     * Septic bursitis of elbow, right  Kong Lamb is a 52 y.o. male with spetic olecranon bursitis of the right elbow. Orthopedics initially consulted for concern for compartment syndrome due to edema vs septic joint.     - No concern for acute compartment syndrome, ACE wrap " present to RUE on exam with improving edema  - No concern for septic arthritis at this time, pain on flexion and extension of the arm is due to irration at olecranon bursa rather than joint as evidenced by painless pronation and supination of the arm, CRP/ESR/WBC all within normal limits  - Cultures currently pending from olecranon bursa aspirate, NGTD  - WBAT RUE, ROMAT RUE  - Continue IV abx per ID recs   - Orthopedics will continue to follow          TRAVON Eldridge MD  Orthopedics  [facility] - Telemetry Stepdown

## 2022-08-03 NOTE — DISCHARGE SUMMARY
"[facility] - Telemetry [facility] Medicine  Discharge Summary      Patient Name: Kong Lamb  MRN: [id number]  Patient Class: IP- Inpatient  Admission Date: 8/1/2022  Hospital Length of Stay: 2 days  Discharge Date and Time: No discharge date for patient encounter.  Attending Physician: Janet Jeffries MD   Discharging Provider: Janet Jeffries MD  Primary Care Provider: Wes Singh MD  [facility] Medicine Team: [facility]  Janet Jeffries MD    HPI:   Kong Lamb is a 52 y.o. male with medical history of RLE cellulitis, Diverticulitis presenting to the ED with the chief complaint of joint swelling. Patient reports chronic BL elbow swelling and pain that he contributes to "bumping" his elbow during his work as a . Reports having his elbows drained previously. Reports having R elbow swelling for the past week and was treated with an unknown antibiotic. Reports acutely worsening R elbow swelling and pain today. He was seen by his PCP who drained his elbow bursa. PCP was concerned about the amount of blood that was drained and referred him to the ED for further evaluation. Denies any specific trauma today. Reports having reduced ROM of his R elbow and was able to range his R elbow normally yesterday. No history of RUE surgeries. No blood thinner use. Reports marijuana use. Denies other recreational drug use.     In the ED patient afebrile and hemodynamically stable saturating well on room air. WBC 17.68. CPK 1535. ESR and CRP wnl. Orthopedic surgery consulted and evaluated patient. Ortho consulted and suspect infected bursa with a degree of myositis. CT elbow pending. Ortho performed arthrocentesis at bedside. Vanc and Aztreonam ordered. Patient admitted to the care of medicine for further evaluation and management.      * No surgery found *      Hospital Course:   Mr. Lamb was admitted to Hospital Medicine for management of septic bursitis.  Ortho was consulted and performed an aspiration on 8/1.  He " "was started on Vanc and Aztreonam.  ID was consulted.  Cultures remained negative.  He was discharged on Doxy for 2 weeks.  He was given a referral to ID and Ortho on DC.       Goals of Care Treatment Preferences:  Code Status: Full Code      Consults:   Consults (From admission, onward)        Status Ordering Provider     Inpatient consult to Infectious Diseases  Once        Provider:  (Not yet assigned)    Completed SYLWIA VALLEJO     Pharmacy to dose Vancomycin consult  Once        Provider:  (Not yet assigned)   "And" Linked Group Details    Acknowledged TARAS FULTON     Inpatient consult to Orthopedic Surgery  Once        Provider:  (Not yet assigned)    Completed TARAS JAMES          No new Assessment & Plan notes have been filed under this hospital service since the last note was generated.  Service: Hospital Medicine    Final Active Diagnoses:    Diagnosis Date Noted POA    PRINCIPAL PROBLEM:  Septic bursitis of elbow, right [M71.121] 08/01/2022 Yes    Obese [E66.9] 08/02/2022 Yes    Myositis [M60.9] 08/01/2022 Yes      Problems Resolved During this Admission:       Discharged Condition: good    Disposition:     Follow Up:   Follow-up Information     Wes Singh MD Follow up on 8/8/2022.    Specialty: Urgent Care  Why: Established pt's hospital f/u visit @ 10:00am. Please bring discharge summary, ID, insurance card, and medication list.  Contact information:  [address] [place] [id number]  [phone]                       Patient Instructions:      Ambulatory referral/consult to Orthopedics   Standing Status: Future   Referral Priority: Routine Referral Type: Consultation   Requested Specialty: Orthopedic Surgery     Ambulatory referral/consult to Infectious Disease   Standing Status: Future   Referral Priority: Routine Referral Type: Consultation   Referral Reason: Specialty Services Required   Requested Specialty: Infectious Diseases       Pending " Diagnostic Studies:     Procedure Component Value Units Date/Time    CBC with Automated Differential [[id number]]     Order Status: Sent Lab Status: No result     Specimen: Blood     CK [[id number]]     Order Status: Sent Lab Status: No result     Specimen: Blood     Comprehensive Metabolic Panel (CMP) [[id number]]     Order Status: Sent Lab Status: No result     Specimen: Blood     VANCOMYCIN, TROUGH [[id number]]     Order Status: Sent Lab Status: No result     Specimen: Blood          Medications:  Reconciled Home Medications:      Medication List      START taking these medications    doxycycline 100 MG Cap  Commonly known as: VIBRAMYCIN  Take 1 capsule (100 mg total) by mouth 2 (two) times daily. for 14 days     oxyCODONE 20 mg Tab immediate release tablet  Commonly known as: ROXICODONE  Take 1 tablet (20 mg total) by mouth every 4 (four) hours as needed.        STOP taking these medications    diazePAM 5 MG tablet  Commonly known as: VALIUM     HYDROcodone-acetaminophen 5-325 mg per tablet  Commonly known as: NORCO            Indwelling Lines/Drains at time of discharge:   Lines/Drains/Airways     None                 Time spent on the discharge of patient: 35 minutes         Janet Jeffries MD  Department of Hospital Medicine  [facility] - Telemetry Stepdown

## 2022-08-03 NOTE — ASSESSMENT & PLAN NOTE
Right elbow septic bursitis, WBC 5786.  Gram stain negative, Cx's from bursa (not joint space, clarified with ortho) NGTD.  With patient's permission spoke with his PCP Dr. Cook; did not send for cultures during last drainage, serosanguinous in appearance, and was treated with Bactrim.  Unsure if cultures will grow due to negative gram stain.  At least recommend MRSA nasal swab Cx to give some clues, will cover for Staph with empiric treatment.    Recommendations:  -Discontinue vancomycin and levofloxacin  -Start doxycycline 100mg BID, recommend 2 weeks of therapy  -MRSA nasal swab Cx  -Allergy outpatient referral regarding penicillin allergy  -With patient's permission, will update his PCP

## 2022-08-03 NOTE — PROGRESS NOTES
"[facility] - Telemetry [facility] Medicine  Progress Note    Patient Name: Kong Lamb  MRN: [id number]  Patient Class: IP- Inpatient   Admission Date: 8/1/2022  Length of Stay: 2 days  Attending Physician: Janet Jeffries MD  Primary Care Provider: Wes Singh MD        Subjective:     Principal Problem:Septic bursitis of elbow, right        HPI:  Kong Lamb is a 52 y.o. male with medical history of RLE cellulitis, Diverticulitis presenting to the ED with the chief complaint of joint swelling. Patient reports chronic BL elbow swelling and pain that he contributes to "bumping" his elbow during his work as a . Reports having his elbows drained previously. Reports having R elbow swelling for the past week and was treated with an unknown antibiotic. Reports acutely worsening R elbow swelling and pain today. He was seen by his PCP who drained his elbow bursa. PCP was concerned about the amount of blood that was drained and referred him to the ED for further evaluation. Denies any specific trauma today. Reports having reduced ROM of his R elbow and was able to range his R elbow normally yesterday. No history of RUE surgeries. No blood thinner use. Reports marijuana use. Denies other recreational drug use.     In the ED patient afebrile and hemodynamically stable saturating well on room air. WBC 17.68. CPK 1535. ESR and CRP wnl. Orthopedic surgery consulted and evaluated patient. Ortho consulted and suspect infected bursa with a degree of myositis. CT elbow pending. Ortho performed arthrocentesis at bedside. Vanc and Aztreonam ordered. Patient admitted to the care of medicine for further evaluation and management.      Overview/Hospital Course:  Mr. Lamb was admitted to Hospital Medicine for management of septic bursitis.  Ortho was consulted and performed an aspiration on 8/1.  He was started on Vanc and Aztreonam.  ID was consulted.  Cultures remained negative.  He was discharged on Doxy for 2 weeks.  He " was given a referral to ID and Ortho on DC.      Interval History: No acute events overnight.  Swelling improved but bruising to right upper arm from wrapping.  Wants to go home.    Review of Systems   Constitutional:  Negative for chills, fatigue and fever.   Respiratory:  Negative for cough and shortness of breath.    Cardiovascular:  Negative for chest pain, palpitations and leg swelling.   Gastrointestinal:  Negative for abdominal pain, diarrhea, nausea and vomiting.   Genitourinary:  Negative for dysuria and urgency.   Musculoskeletal:  Positive for joint swelling.   Neurological:  Negative for dizziness and headaches.   All other systems reviewed and are negative.  Objective:     Vital Signs (Most Recent):  Temp: 97.8 °F (36.6 °C) (08/03/22 1214)  Pulse: 73 (08/03/22 1214)  Resp: 18 (08/03/22 1214)  BP: 133/84 (08/03/22 1214)  SpO2: 98 % (08/03/22 1214) Vital Signs (24h Range):  Temp:  [96.3 °F (35.7 °C)-98.2 °F (36.8 °C)] 97.8 °F (36.6 °C)  Pulse:  [60-75] 73  Resp:  [18-22] 18  SpO2:  [94 %-99 %] 98 %  BP: (111-143)/(66-84) 133/84     Weight: 102.1 kg (225 lb)  Body mass index is 31.38 kg/m².    Intake/Output Summary (Last 24 hours) at 8/3/2022 1450  Last data filed at 8/3/2022 0531  Gross per 24 hour   Intake 350 ml   Output 800 ml   Net -450 ml        Physical Exam  Constitutional:       Appearance: Normal appearance. He is well-developed. He is obese.   HENT:      Head: Normocephalic and atraumatic.   Cardiovascular:      Rate and Rhythm: Normal rate and regular rhythm.      Heart sounds: No murmur heard.  Pulmonary:      Effort: Pulmonary effort is normal. No respiratory distress.      Breath sounds: Normal breath sounds. No wheezing or rales.   Abdominal:      General: There is no distension.      Palpations: Abdomen is soft.      Tenderness: There is no abdominal tenderness.   Musculoskeletal:         General: Swelling (Right elbow, wrapped) present. No deformity.   Skin:     General: Skin is warm.       Comments: Bruising to right upper arm from wrapping   Neurological:      General: No focal deficit present.      Mental Status: He is alert and oriented to person, place, and time. Mental status is at baseline.       Significant Labs: All pertinent labs within the past 24 hours have been reviewed.  CBC:   Recent Labs   Lab 08/01/22 1659 08/02/22  0257   WBC 17.68* 11.94   HGB 16.4 14.3   HCT 47.1 42.0    205       CMP:   Recent Labs   Lab 08/01/22  1659 08/02/22  0257    137   K 4.0 3.9    104   CO2 22* 24   * 99   BUN 18 17   CREATININE 1.1 1.0   CALCIUM 9.3 8.8   PROT 7.1 5.6*   ALBUMIN 4.0 3.2*   BILITOT 0.5 0.6   ALKPHOS 84 71   AST 75* 54*   ALT 64* 55*   ANIONGAP 11 9         Significant Imaging: I have reviewed all pertinent imaging results/findings within the past 24 hours.      Assessment/Plan:      * Septic bursitis of elbow, right  · Xray Posterior distal right upper arm and elbow localized soft tissue swelling/contusion without acute osseous process seen.   · CT elbow with soft tissue edema  · Ortho consulted  · S/p Ortho aspiration on 8/1  · Cx NGTD  · WBC normalized  · ID consulted:  Plan for MRSA nasal swab, DC on Doxy x 14 days  · Pain control with prn Oxy IR and IV Morphine    Obese  · Body mass index is 31.38 kg/m².   · Morbid obesity complicates all aspects of disease management from diagnostic modalities to treatment.   · Weight loss encouraged and health benefits explained to patient.         Myositis  · CPK 1500 on admit, trending down      VTE Risk Mitigation (From admission, onward)         Ordered     IP VTE HIGH RISK PATIENT  Once         08/01/22 2108     Place sequential compression device  Until discontinued         08/01/22 2108                Discharge Planning   PARVEEN: 8/3/2022     Code Status: Full Code   Is the patient medically ready for discharge?:     Reason for patient still in hospital (select all that apply): Consult recommendations  Discharge Plan A:  Home with family                  Janet Jeffries MD  Department of Hospital Medicine   [facility] - Telemetry Stepdown

## 2022-08-03 NOTE — NURSING
Discharge instructions were reviewed with the pt. Questions were asked and answers were given. Meds were delivered to the bedside. Pt left the floor via independently off the unit with son.

## 2022-08-03 NOTE — SUBJECTIVE & OBJECTIVE
Interval History: Afebrile, NAEO, gram stain negative, Cx NGTD.    Review of Systems   Constitutional:  Negative for chills and fever.   HENT:  Negative for facial swelling and sore throat.    Respiratory:  Negative for cough and shortness of breath.    Cardiovascular:  Negative for chest pain.   Gastrointestinal:  Negative for abdominal pain, diarrhea, nausea and vomiting.   Musculoskeletal:  Positive for joint swelling.   Skin:  Negative for wound.   Neurological:  Negative for headaches.   Psychiatric/Behavioral:  Negative for agitation, behavioral problems, confusion and decreased concentration.    Objective:     Vital Signs (Most Recent):  Temp: 97.8 °F (36.6 °C) (08/03/22 1214)  Pulse: 73 (08/03/22 1214)  Resp: 18 (08/03/22 1214)  BP: 133/84 (08/03/22 1214)  SpO2: 98 % (08/03/22 1214) Vital Signs (24h Range):  Temp:  [96.3 °F (35.7 °C)-98.2 °F (36.8 °C)] 97.8 °F (36.6 °C)  Pulse:  [60-75] 73  Resp:  [18-22] 18  SpO2:  [94 %-99 %] 98 %  BP: (111-143)/(66-84) 133/84     Weight: 102.1 kg (225 lb)  Body mass index is 31.38 kg/m².    Estimated Creatinine Clearance: 105.1 mL/min (based on SCr of 1 mg/dL).    Physical Exam  Vitals reviewed.   Constitutional:       General: He is not in acute distress.     Appearance: He is normal weight. He is not ill-appearing, toxic-appearing or diaphoretic.   HENT:      Head: Normocephalic and atraumatic.      Right Ear: External ear normal.      Left Ear: External ear normal.      Nose: Nose normal.   Eyes:      General: No scleral icterus.        Right eye: No discharge.         Left eye: No discharge.      Extraocular Movements: Extraocular movements intact.      Conjunctiva/sclera: Conjunctivae normal.   Cardiovascular:      Rate and Rhythm: Normal rate and regular rhythm.      Heart sounds: Normal heart sounds. No murmur heard.  Pulmonary:      Effort: Pulmonary effort is normal. No respiratory distress.      Breath sounds: Normal breath sounds.   Abdominal:      General:  Abdomen is flat.      Palpations: Abdomen is soft.      Tenderness: There is no abdominal tenderness.   Musculoskeletal:         General: Swelling and signs of injury present. Normal range of motion.      Cervical back: Normal range of motion.      Comments: -right elbow olecranon bursa swollen, full  -elbows full ROM   Skin:     General: Skin is warm and dry.      Coloration: Skin is not jaundiced.      Comments: -multiple tattoos   Neurological:      Mental Status: He is alert and oriented to person, place, and time. Mental status is at baseline.   Psychiatric:         Mood and Affect: Mood normal.         Behavior: Behavior normal.         Thought Content: Thought content normal.         Judgment: Judgment normal.       Significant Labs: All pertinent labs within the past 24 hours have been reviewed.    Significant Imaging: I have reviewed all pertinent imaging results/findings within the past 24 hours.

## 2022-08-03 NOTE — SUBJECTIVE & OBJECTIVE
Interval History: No acute events overnight.  Swelling improved but bruising to right upper arm from wrapping.  Wants to go home.    Review of Systems   Constitutional:  Negative for chills, fatigue and fever.   Respiratory:  Negative for cough and shortness of breath.    Cardiovascular:  Negative for chest pain, palpitations and leg swelling.   Gastrointestinal:  Negative for abdominal pain, diarrhea, nausea and vomiting.   Genitourinary:  Negative for dysuria and urgency.   Musculoskeletal:  Positive for joint swelling.   Neurological:  Negative for dizziness and headaches.   All other systems reviewed and are negative.  Objective:     Vital Signs (Most Recent):  Temp: 97.8 °F (36.6 °C) (08/03/22 1214)  Pulse: 73 (08/03/22 1214)  Resp: 18 (08/03/22 1214)  BP: 133/84 (08/03/22 1214)  SpO2: 98 % (08/03/22 1214) Vital Signs (24h Range):  Temp:  [96.3 °F (35.7 °C)-98.2 °F (36.8 °C)] 97.8 °F (36.6 °C)  Pulse:  [60-75] 73  Resp:  [18-22] 18  SpO2:  [94 %-99 %] 98 %  BP: (111-143)/(66-84) 133/84     Weight: 102.1 kg (225 lb)  Body mass index is 31.38 kg/m².    Intake/Output Summary (Last 24 hours) at 8/3/2022 1450  Last data filed at 8/3/2022 0531  Gross per 24 hour   Intake 350 ml   Output 800 ml   Net -450 ml        Physical Exam  Constitutional:       Appearance: Normal appearance. He is well-developed. He is obese.   HENT:      Head: Normocephalic and atraumatic.   Cardiovascular:      Rate and Rhythm: Normal rate and regular rhythm.      Heart sounds: No murmur heard.  Pulmonary:      Effort: Pulmonary effort is normal. No respiratory distress.      Breath sounds: Normal breath sounds. No wheezing or rales.   Abdominal:      General: There is no distension.      Palpations: Abdomen is soft.      Tenderness: There is no abdominal tenderness.   Musculoskeletal:         General: Swelling (Right elbow, wrapped) present. No deformity.   Skin:     General: Skin is warm.      Comments: Bruising to right upper arm from  wrapping   Neurological:      General: No focal deficit present.      Mental Status: He is alert and oriented to person, place, and time. Mental status is at baseline.       Significant Labs: All pertinent labs within the past 24 hours have been reviewed.  CBC:   Recent Labs   Lab 08/01/22 1659 08/02/22  0257   WBC 17.68* 11.94   HGB 16.4 14.3   HCT 47.1 42.0    205       CMP:   Recent Labs   Lab 08/01/22 1659 08/02/22 0257    137   K 4.0 3.9    104   CO2 22* 24   * 99   BUN 18 17   CREATININE 1.1 1.0   CALCIUM 9.3 8.8   PROT 7.1 5.6*   ALBUMIN 4.0 3.2*   BILITOT 0.5 0.6   ALKPHOS 84 71   AST 75* 54*   ALT 64* 55*   ANIONGAP 11 9         Significant Imaging: I have reviewed all pertinent imaging results/findings within the past 24 hours.

## 2022-08-03 NOTE — SUBJECTIVE & OBJECTIVE
"Principal Problem:Septic bursitis of elbow, right    Principal Orthopedic Problem: Same    Interval History: Patient seen and evaluated at [facility].  Patient reports sleeping poorly due to pain, but says his swelling has significantly improved.  No other concerns this morning.      Review of patient's allergies indicates:   Allergen Reactions    Pcn [penicillins] Anaphylaxis    Pcn [penicillins]        Current Facility-Administered Medications   Medication    acetaminophen tablet 1,000 mg    albuterol-ipratropium 2.5 mg-0.5 mg/3 mL nebulizer solution 3 mL    aluminum-magnesium hydroxide-simethicone 200-200-20 mg/5 mL suspension 30 mL    aztreonam 2 g in dextrose 5 % 100 mL IVPB (ready to mix system)    dextrose 10% bolus 125 mL    dextrose 10% bolus 250 mL    glucagon (human recombinant) injection 1 mg    glucose chewable tablet 16 g    glucose chewable tablet 24 g    iohexoL (OMNIPAQUE 350) injection 100 mL    melatonin tablet 6 mg    morphine injection 6 mg    naloxone 0.4 mg/mL injection 0.02 mg    ondansetron injection 4 mg    oxyCODONE immediate release tablet Tab 10 mg    senna-docusate 8.6-50 mg per tablet 1 tablet    sodium chloride 0.9% flush 10 mL    vancomycin - pharmacy to dose    vancomycin 1.5 g in dextrose 5 % 250 mL IVPB (ready to mix)     Objective:     Vital Signs (Most Recent):  Temp: 96.3 °F (35.7 °C) (08/03/22 0714)  Pulse: 65 (08/03/22 0714)  Resp: 18 (08/03/22 0734)  BP: 131/81 (08/03/22 0714)  SpO2: 97 % (08/03/22 0714) Vital Signs (24h Range):  Temp:  [96.3 °F (35.7 °C)-98.2 °F (36.8 °C)] 96.3 °F (35.7 °C)  Pulse:  [60-75] 65  Resp:  [18-22] 18  SpO2:  [94 %-99 %] 97 %  BP: (111-143)/(66-82) 131/81     Weight: 102.1 kg (225 lb)  Height: 5' 11" (180.3 cm)  Body mass index is 31.38 kg/m².      Intake/Output Summary (Last 24 hours) at 8/3/2022 0801  Last data filed at 8/3/2022 0531  Gross per 24 hour   Intake 350 ml   Output 800 ml   Net -450 ml       Ortho/SPM Exam  Right Upper " "extremity  Inspection  - Previous small incisions at site of aspirations to the skin overlying the olecranon with small area of ecchymosis  - Moderate edema to olecranon bursa.  Palpation  - TTP to olecranon and distal posterior arm  Range of motion  - AROM and PROM of the shoulder, elbow, wrist, and hand intact  - No pain with pronation/supination of arm   - Painful flexion and extension of arm with improved ROM  Stability  - No evidence of joint dislocation or abnormal laxity  Neurovascular  - AIN/PIN/Radial/Median/Ulnar Nerves assessed in isolation without deficit  - Able to give thumbs up, make "OK" sign, cross IF/LF, abduct/adduct fingers, make fist  - SILT throughout  - Compartments soft  - Radial artery palpated  - Muscle tone normal    Significant Labs: CBC:   Recent Labs   Lab 08/01/22 1659 08/02/22  0257   WBC 17.68* 11.94   HGB 16.4 14.3   HCT 47.1 42.0    205     CMP:   Recent Labs   Lab 08/01/22 1659 08/02/22  0257    137   K 4.0 3.9    104   CO2 22* 24   * 99   BUN 18 17   CREATININE 1.1 1.0   CALCIUM 9.3 8.8   PROT 7.1 5.6*   ALBUMIN 4.0 3.2*   BILITOT 0.5 0.6   ALKPHOS 84 71   AST 75* 54*   ALT 64* 55*   ANIONGAP 11 9     All pertinent labs within the past 24 hours have been reviewed.    Significant Imaging: I have reviewed all pertinent imaging results/findings.  "

## 2022-08-03 NOTE — ASSESSMENT & PLAN NOTE
Kong Lamb is a 52 y.o. male with spetic olecranon bursitis of the right elbow. Orthopedics initially consulted for concern for compartment syndrome due to edema vs septic joint.     - No concern for acute compartment syndrome, ACE wrap present to RUE on exam with improving edema  - No concern for septic arthritis at this time, pain on flexion and extension of the arm is due to irration at olecranon bursa rather than joint as evidenced by painless pronation and supination of the arm, CRP/ESR/WBC all within normal limits  - Cultures currently pending from olecranon bursa aspirate, NGTD  - WBAT RUE, ROMAT RUE  - Continue IV abx per ID recs   - Orthopedics will continue to follow

## 2022-08-03 NOTE — CARE UPDATE
"RAPID RESPONSE NURSE CHART REVIEW        Chart Reviewed: 08/02/2022, 7:51 PM    MRN: [id number]  Bed: 8083/8083 A    Dx: Septic bursitis of elbow, right    Kong Lamb has a past medical history of Cellulitis.    Last VS: BP (!) 143/82 (BP Location: Left arm, Patient Position: Sitting)   Pulse 70   Temp 98.1 °F (36.7 °C) (Oral)   Resp 19   Ht 5' 11" (1.803 m)   Wt 102.1 kg (225 lb)   SpO2 (!) 94%   BMI 31.38 kg/m²     24H Vital Sign Range:  Temp:  [9.2 °F (-12.7 °C)-98.1 °F (36.7 °C)]   Pulse:  [60-76]   Resp:  [16-22]   BP: (111-146)/(60-84)   SpO2:  [94 %-99 %]     Level of Consciousness (AVPU): alert    Recent Labs     08/01/22  1659 08/02/22  0257   WBC 17.68* 11.94   HGB 16.4 14.3   HCT 47.1 42.0    205       Recent Labs     08/01/22  1659 08/02/22  0257    137   K 4.0 3.9    104   CO2 22* 24   CREATININE 1.1 1.0   * 99        No results for input(s): PH, PCO2, PO2, HCO3, POCSATURATED, BE in the last 72 hours.     OXYGEN:        O2 Device (Oxygen Therapy): room air    MEWS score: 1    Bedside Mae VERA contacted for error in temperature charting via flowsheet data. No additional concerns verbalized at this time. Instructed to call [facility] for further concerns or assistance.    Ava Oneill RN        "

## 2022-08-03 NOTE — ASSESSMENT & PLAN NOTE
· Xray Posterior distal right upper arm and elbow localized soft tissue swelling/contusion without acute osseous process seen.   · CT elbow with soft tissue edema  · Ortho consulted  · S/p Ortho aspiration on 8/1  · Cx NGTD  · WBC normalized  · ID consulted:  Plan for MRSA nasal swab, DC on Doxy x 14 days  · Pain control with prn Oxy IR and IV Morphine

## 2022-08-03 NOTE — PLAN OF CARE
Pt refused AM labs. Doctor made aware      Problem: Adult Inpatient Plan of Care  Goal: Patient-Specific Goal (Individualized)  Outcome: Ongoing, Progressing  Goal: Absence of Hospital-Acquired Illness or Injury  Outcome: Ongoing, Progressing  Goal: Optimal Comfort and Wellbeing  Outcome: Ongoing, Progressing  Goal: Readiness for Transition of Care  Outcome: Ongoing, Progressing     Problem: Impaired Wound Healing  Goal: Optimal Wound Healing  Outcome: Ongoing, Progressing

## 2022-08-04 NOTE — PLAN OF CARE
[facility] - Telemetry Stepdown  Discharge Final Note    Primary Care Provider: Wes Singh MD    Expected Discharge Date: 8/3/2022       Future Appointments   Date Time Provider Department Center   8/18/2022  1:00 PM Balise Saavedra MD [facility] ID [facility]       Final Discharge Note (most recent)     Final Note - 08/04/22 0747        Final Note    Assessment Type Final Discharge Note     Anticipated Discharge Disposition Home or Self Care     What phone number can be called within the next 1-3 days to see how you are doing after discharge? [phone]     Hospital Resources/Appts/Education Provided Appointments scheduled and added to AVS   Ambulatory referral sent to Orthopedics.       Post-Acute Status    Post-Acute Authorization Other     Other Status No Post-Acute Service Needs                 Important Message from Medicare             Contact Info     Wes Singh MD   Specialty: Urgent Care   Relationship: PCP - General    [address] [place] [id number]   Phone: [phone]       Next Steps: Follow up on 8/8/2022    Instructions: Established pt's hospital f/u visit @ 10:00am. Please bring discharge summary, ID, insurance card, and medication list.          Maddy Campos RN  Ext [phone]

## 2022-08-05 LAB
BACTERIA SPEC AEROBE CULT: NO GROWTH
MRSA SPEC QL CULT: NORMAL

## 2022-08-07 LAB
BACTERIA BLD CULT: NORMAL
BACTERIA BLD CULT: NORMAL

## 2022-08-09 LAB — BACTERIA SPEC ANAEROBE CULT: NORMAL

## 2022-08-31 LAB — FUNGUS SPEC CULT: NORMAL

## 2022-09-19 LAB
ACID FAST MOD KINY STN SPEC: NORMAL
MYCOBACTERIUM SPEC QL CULT: NORMAL

## 2023-11-20 ENCOUNTER — HOSPITAL ENCOUNTER (EMERGENCY)
Facility: HOSPITAL | Age: 53
Discharge: LEFT BEFORE DISCHARGE | End: 2023-11-20
Attending: EMERGENCY MEDICINE
Payer: MEDICAID

## 2023-11-20 VITALS
HEART RATE: 77 BPM | DIASTOLIC BLOOD PRESSURE: 89 MMHG | RESPIRATION RATE: 18 BRPM | OXYGEN SATURATION: 97 % | TEMPERATURE: 98 F | BODY MASS INDEX: 32.2 KG/M2 | HEIGHT: 71 IN | WEIGHT: 230 LBS | SYSTOLIC BLOOD PRESSURE: 137 MMHG

## 2023-11-20 DIAGNOSIS — S60.10XA SUBUNGUAL HEMATOMA OF DIGIT OF HAND, INITIAL ENCOUNTER: Primary | ICD-10-CM

## 2023-11-20 PROCEDURE — 25000003 PHARM REV CODE 250

## 2023-11-20 PROCEDURE — 25000003 PHARM REV CODE 250: Performed by: EMERGENCY MEDICINE

## 2023-11-20 PROCEDURE — 99900041 HC LEFT WITHOUT BEING SEEN- EMERGENCY

## 2023-11-20 RX ORDER — HYDROCODONE BITARTRATE AND ACETAMINOPHEN 5; 325 MG/1; MG/1
1 TABLET ORAL
Status: COMPLETED | OUTPATIENT
Start: 2023-11-20 | End: 2023-11-20

## 2023-11-20 RX ORDER — NAPROXEN 500 MG/1
500 TABLET ORAL
Status: COMPLETED | OUTPATIENT
Start: 2023-11-20 | End: 2023-11-20

## 2023-11-20 RX ADMIN — NAPROXEN 500 MG: 500 TABLET ORAL at 07:11

## 2023-11-20 RX ADMIN — HYDROCODONE BITARTRATE AND ACETAMINOPHEN 1 TABLET: 5; 325 TABLET ORAL at 07:11

## 2023-11-20 NOTE — ED PROVIDER NOTES
"Encounter Date: 11/20/2023       History     Chief Complaint   Patient presents with    Finger Injury     "Smashed" right pointer finger yesterday. Increased swelling this morning      Pt is a 53 year old male with no pertinent PMHx who presents for evaluation of right index finger injury which occurred 1 day ago. He complains of pain, swelling, and paraesthesia to the index finger. Denies any other injury. No fever, chills, chest pain, shortness of breath, nausea, or abdominal pain    The history is provided by the patient.     Review of patient's allergies indicates:   Allergen Reactions    Pcn [penicillins] Anaphylaxis    Pcn [penicillins]      Past Medical History:   Diagnosis Date    Cellulitis      Past Surgical History:   Procedure Laterality Date    CHOLECYSTECTOMY       No family history on file.  Social History     Tobacco Use    Smoking status: Never    Smokeless tobacco: Never   Substance Use Topics    Alcohol use: No    Drug use: No     Review of Systems    Physical Exam     Initial Vitals [11/20/23 0551]   BP Pulse Resp Temp SpO2   (!) 140/84 86 18 97.4 °F (36.3 °C) 96 %      MAP       --         Physical Exam    Nursing note and vitals reviewed.  Constitutional: He appears well-developed and well-nourished. No distress.   HENT:   Head: Normocephalic and atraumatic.   Right Ear: External ear normal.   Eyes: Conjunctivae and EOM are normal. Pupils are equal, round, and reactive to light.   Neck: Neck supple. No tracheal deviation present.   Normal range of motion.  Cardiovascular:  Normal rate, regular rhythm and intact distal pulses.           No murmur heard.  Pulmonary/Chest: Breath sounds normal. No stridor. No respiratory distress. He has no wheezes. He has no rales.   Abdominal: Abdomen is soft. He exhibits no distension. There is no abdominal tenderness.   Musculoskeletal:         General: Normal range of motion.      Cervical back: Normal range of motion and neck supple.      Comments: " Right index finger tenderness to palpation and edema      Neurological: He is alert and oriented to person, place, and time. He has normal strength.   Skin: Skin is warm and dry.   Right index finger subungual hematoma        ED Course   Procedures  Labs Reviewed - No data to display       Imaging Results              X-Ray Hand 3 View Right (Final result)  Result time 11/20/23 08:10:37      Final result by Lesli Rojas MD (11/20/23 08:10:37)                   Impression:      No acute fracture identified.      Electronically signed by: Lesli Rojas MD  Date:    11/20/2023  Time:    08:10               Narrative:    EXAMINATION:  XR HAND COMPLETE 3 VIEW RIGHT    CLINICAL HISTORY:  hand injury;    TECHNIQUE:  PA, lateral, and oblique views of the right hand were performed.    COMPARISON:  None    FINDINGS:  Normal alignment, normal mineralization.    No fracture identified.    Distal interphalangeal joint space narrowing.  First proximal interphalangeal joint space narrowing.    The 4th and 5th finger appear in flexion.    The radiocarpal joint and carpal bones appear intact.    There is osseous remodeling of the 4th metacarpal bone from a prior trauma.    No soft tissue air, no foreign body.                                       Medications   naproxen tablet 500 mg (500 mg Oral Given 11/20/23 0757)   HYDROcodone-acetaminophen 5-325 mg per tablet 1 tablet (1 tablet Oral Given 11/20/23 0757)     Medical Decision Making  Pt presents for right index finger injury. X-ray without fracture or dislocation. Planned for trephination and assessment of joint mobility following pain control. Unable to perform trephination or reassess as pt had eloped     Amount and/or Complexity of Data Reviewed  Radiology: ordered. Decision-making details documented in ED Course.    Risk  Prescription drug management.              Attending Attestation:   Physician Attestation Statement for Resident:  As the supervising MD    Physician Attestation Statement: I have personally seen and examined this patient.   I agree with the above history.  - with the following exceptions: This is a 53-year-old right-handed male who presents to the emergency department after an injury that he sustained yesterday.  He was working on a car and got the tip of his right index finger stuck and some equipment briefly.  Since then he is had increased swelling to the distal phalanx of that right index finger as well as evidence of a subungual hematoma that is approximately 40% of the proximal nail bed.  He has continued unrelenting discomfort in that area prompting his arrival to the emergency department today.  He did not hurt any other area of the body when this occurred and was at his baseline state of health prior to injury.   As the supervising MD I agree with the above PE.   - with the following exceptions: Pertinent findings:  Patient's hemodynamics are normal (blood pressure 137/89; heart rate 77; respiratory rate 18; O2 sat 97% room air; afebrile).  The patient's right index finger has swelling over the distal phalanx that is fairly diffuse. He has a 40% subungual hematoma to the proximal nailbed. He has 2 point discrimination intact. He could not comply with function testing at each isolated IP joint initially as he stated he was hurting too badly. Thus our plan was to treat for pain and then attempt isolating each IP.      As the supervising MD I agree with the above treatment, course, plan, and disposition.   - with the following exceptions: Our differential/issues to address included:  -ruling out fracture of the distal phalanx  -ruling out flexor/extensor tendon injury of the right index finger  -addressing the subungual hematoma (seemed to be causing the patient quite a bit of pain so we were contemplating trephinating to alleviate some discomfort)  We treated the patient with oral lortab to see if this would help control his pain.   Plain  films, independently reviewed and interpreted by me and interpreted by radiology, reveals no evidence of acute fracture or malalignment.   At that point we went back to reassess patient's function and potentially trephinate, but patient had eloped. Thus, care could not be completed.   ED Diagnosis:  1. Acute subungual hematoma, right index finger.    I have reviewed and agree with the residents interpretation of the following: x-rays.  I have reviewed the following: old records at this facility.              ED Course as of 11/20/23 2126 Mon Nov 20, 2023   0816 X-Ray Hand 3 View Right [AS]      ED Course User Index  [AS] Nghia Franklin Jr., MD                        Clinical Impression:  Final diagnoses:  [S60.10XA] Subungual hematoma of digit of hand, initial encounter (Primary)          ED Disposition Condition    Eloped                 Nghia Franklin Jr., MD  Resident  11/20/23 2126       Edita William MD  11/24/23 1112

## 2024-07-14 ENCOUNTER — HOSPITAL ENCOUNTER (EMERGENCY)
Facility: HOSPITAL | Age: 54
Discharge: HOME | End: 2024-07-14
Attending: EMERGENCY MEDICINE
Payer: MEDICAID

## 2024-07-14 VITALS
HEIGHT: 71 IN | BODY MASS INDEX: 30.8 KG/M2 | DIASTOLIC BLOOD PRESSURE: 79 MMHG | WEIGHT: 220 LBS | RESPIRATION RATE: 20 BRPM | TEMPERATURE: 98 F | HEART RATE: 62 BPM | OXYGEN SATURATION: 98 % | SYSTOLIC BLOOD PRESSURE: 162 MMHG

## 2024-07-14 DIAGNOSIS — F41.9 ANXIETY: ICD-10-CM

## 2024-07-14 DIAGNOSIS — F15.10 METHAMPHETAMINE ABUSE: ICD-10-CM

## 2024-07-14 DIAGNOSIS — R07.9 CHEST PAIN: Primary | ICD-10-CM

## 2024-07-14 LAB
ALBUMIN SERPL BCP-MCNC: 3.9 G/DL (ref 3.5–5.2)
ALP SERPL-CCNC: 87 U/L (ref 55–135)
ALT SERPL W/O P-5'-P-CCNC: 28 U/L (ref 10–44)
ANION GAP SERPL CALC-SCNC: 9 MMOL/L (ref 8–16)
AST SERPL-CCNC: 24 U/L (ref 10–40)
BASOPHILS # BLD AUTO: 0.04 K/UL (ref 0–0.2)
BASOPHILS NFR BLD: 0.5 % (ref 0–1.9)
BILIRUB SERPL-MCNC: 0.5 MG/DL (ref 0.1–1)
BNP SERPL-MCNC: 54 PG/ML (ref 0–99)
BUN SERPL-MCNC: 13 MG/DL (ref 6–20)
CALCIUM SERPL-MCNC: 9.9 MG/DL (ref 8.7–10.5)
CHLORIDE SERPL-SCNC: 105 MMOL/L (ref 95–110)
CO2 SERPL-SCNC: 25 MMOL/L (ref 23–29)
CREAT SERPL-MCNC: 0.9 MG/DL (ref 0.5–1.4)
DIFFERENTIAL METHOD BLD: NORMAL
EOSINOPHIL # BLD AUTO: 0.3 K/UL (ref 0–0.5)
EOSINOPHIL NFR BLD: 3.3 % (ref 0–8)
ERYTHROCYTE [DISTWIDTH] IN BLOOD BY AUTOMATED COUNT: 12.5 % (ref 11.5–14.5)
EST. GFR  (NO RACE VARIABLE): >60 ML/MIN/1.73 M^2
GLUCOSE SERPL-MCNC: 109 MG/DL (ref 70–110)
HCT VFR BLD AUTO: 46.8 % (ref 40–54)
HCV AB SERPL QL IA: NORMAL
HGB BLD-MCNC: 15.8 G/DL (ref 14–18)
HIV 1+2 AB+HIV1 P24 AG SERPL QL IA: NORMAL
IMM GRANULOCYTES # BLD AUTO: 0.04 K/UL (ref 0–0.04)
IMM GRANULOCYTES NFR BLD AUTO: 0.5 % (ref 0–0.5)
LYMPHOCYTES # BLD AUTO: 2 K/UL (ref 1–4.8)
LYMPHOCYTES NFR BLD: 22.8 % (ref 18–48)
MCH RBC QN AUTO: 30.6 PG (ref 27–31)
MCHC RBC AUTO-ENTMCNC: 33.8 G/DL (ref 32–36)
MCV RBC AUTO: 91 FL (ref 82–98)
MONOCYTES # BLD AUTO: 0.6 K/UL (ref 0.3–1)
MONOCYTES NFR BLD: 7.3 % (ref 4–15)
NEUTROPHILS # BLD AUTO: 5.8 K/UL (ref 1.8–7.7)
NEUTROPHILS NFR BLD: 65.6 % (ref 38–73)
NRBC BLD-RTO: 0 /100 WBC
PLATELET # BLD AUTO: 294 K/UL (ref 150–450)
PMV BLD AUTO: 9.9 FL (ref 9.2–12.9)
POTASSIUM SERPL-SCNC: 4 MMOL/L (ref 3.5–5.1)
PROT SERPL-MCNC: 7.1 G/DL (ref 6–8.4)
RBC # BLD AUTO: 5.17 M/UL (ref 4.6–6.2)
SODIUM SERPL-SCNC: 139 MMOL/L (ref 136–145)
TROPONIN I SERPL DL<=0.01 NG/ML-MCNC: <0.006 NG/ML (ref 0–0.03)
TROPONIN I SERPL DL<=0.01 NG/ML-MCNC: <0.006 NG/ML (ref 0–0.03)
WBC # BLD AUTO: 8.76 K/UL (ref 3.9–12.7)

## 2024-07-14 PROCEDURE — 80053 COMPREHEN METABOLIC PANEL: CPT | Performed by: EMERGENCY MEDICINE

## 2024-07-14 PROCEDURE — 93010 ELECTROCARDIOGRAM REPORT: CPT | Mod: ,,, | Performed by: INTERNAL MEDICINE

## 2024-07-14 PROCEDURE — 87389 HIV-1 AG W/HIV-1&-2 AB AG IA: CPT | Performed by: PHYSICIAN ASSISTANT

## 2024-07-14 PROCEDURE — 99285 EMERGENCY DEPT VISIT HI MDM: CPT | Mod: 25

## 2024-07-14 PROCEDURE — 85025 COMPLETE CBC W/AUTO DIFF WBC: CPT | Performed by: EMERGENCY MEDICINE

## 2024-07-14 PROCEDURE — 83880 ASSAY OF NATRIURETIC PEPTIDE: CPT | Performed by: EMERGENCY MEDICINE

## 2024-07-14 PROCEDURE — 25000003 PHARM REV CODE 250: Performed by: EMERGENCY MEDICINE

## 2024-07-14 PROCEDURE — 86803 HEPATITIS C AB TEST: CPT | Performed by: PHYSICIAN ASSISTANT

## 2024-07-14 PROCEDURE — 84484 ASSAY OF TROPONIN QUANT: CPT | Mod: 91 | Performed by: EMERGENCY MEDICINE

## 2024-07-14 PROCEDURE — 93005 ELECTROCARDIOGRAM TRACING: CPT

## 2024-07-14 RX ORDER — LORAZEPAM 1 MG/1
1 TABLET ORAL
Status: COMPLETED | OUTPATIENT
Start: 2024-07-14 | End: 2024-07-14

## 2024-07-14 RX ADMIN — LORAZEPAM 1 MG: 1 TABLET ORAL at 12:07

## 2024-07-14 NOTE — ED PROVIDER NOTES
"Chief Complaint   Chest Pain (Started 30 minutes ago. + SOB)      History Of Present Illness   Kong Lamb is a 54 y.o. male presenting with anxiety, panic, chest pressure and discomfort that he awoke with this morning.  He admits to recent meth use, although he says it has been a few days.  He states he recently got out of a bad relationship that was potentiating his meth use.  Right now, he feels overwhelmed, like he can not handle all of his anxieties.  He would like something for anxiety.  He denies chest pain at this time.        Review of patient's allergies indicates:   Allergen Reactions    Pcn [penicillins] Anaphylaxis    Pcn [penicillins]        No current facility-administered medications on file prior to encounter.     Current Outpatient Medications on File Prior to Encounter   Medication Sig Dispense Refill    oxyCODONE (ROXICODONE) 20 mg Tab immediate release tablet Take 1 tablet (20 mg total) by mouth every 4 (four) hours as needed. 30 tablet 0    [DISCONTINUED] diazePAM (VALIUM) 5 MG tablet Take 1 tablet (5 mg total) by mouth every 6 (six) hours as needed (muscle spasm). 20 tablet 0       Past History   As per HPI and below:  Past Medical History:   Diagnosis Date    Cellulitis      Past Surgical History:   Procedure Laterality Date    CHOLECYSTECTOMY         Social History     Tobacco Use    Smoking status: Never    Smokeless tobacco: Never   Substance Use Topics    Alcohol use: No    Drug use: No       No family history on file.    Physical Exam     Vitals:    07/14/24 1206   BP: (!) 161/96   Pulse: (!) 58   Resp: 20   Temp: 97.4 °F (36.3 °C)   TempSrc: Oral   SpO2: 98%   Weight: 99.8 kg (220 lb)   Height: 5' 11" (1.803 m)     Appearance: No acute distress.  Anxious.  Skin: No rashes seen.  Good turgor.  No abrasions.  No ecchymoses.  Eyes: No conjunctival injection.  ENT: Oropharynx clear.    Chest: Clear to auscultation bilaterally.  Good air movement.  No wheezes.  No rhonchi.  Cardiovascular: " Regular rate and rhythm.  No murmurs. No gallops. No rubs.  Abdomen: Soft.  Not distended.  Nontender.  No guarding.  No rebound.  Musculoskeletal: Good range of motion all joints.  No deformities.  Neck supple.  No meningismus.  Neurologic: Motor intact.  Sensation intact.  Cerebellar intact.  Cranial nerves intact.  Mental Status:  Alert and oriented x 3.  Appropriate, conversant.      Initial MDM   Anxiety, chest discomfort (resolved).  Recent meth use.  He is quite anxious in the room, although his heart rate at his triage EKG was in the 50s.  Overall, I think this is all due to drug use and anxiety, but I will do a cardiac workup with 2 troponins to exclude any ischemia.  I am going to give him a dose of benzodiazepine in case he was being less than truthful about his last meth use.  It will also help alleviate some of his symptoms.  Anticipate discharge.      Medications Given     Medications   LORazepam tablet 1 mg (1 mg Oral Given 7/14/24 1248)       Results and Course     Labs Reviewed   HIV 1 / 2 ANTIBODY    Narrative:     Release to patient->Immediate   HEPATITIS C ANTIBODY    Narrative:     Release to patient->Immediate   CBC W/ AUTO DIFFERENTIAL   COMPREHENSIVE METABOLIC PANEL   TROPONIN I   TROPONIN I   B-TYPE NATRIURETIC PEPTIDE       Imaging Results              X-Ray Chest AP Portable (Final result)  Result time 07/14/24 13:39:07      Final result by Halina Ferguson MD (07/14/24 13:39:07)                   Impression:      Blunting the right costophrenic angle suggesting trace pleural fluid with adjacent atelectasis versus consolidation.      Electronically signed by: Halina Ferguson MD  Date:    07/14/2024  Time:    13:39               Narrative:    EXAMINATION:  XR CHEST AP PORTABLE    CLINICAL HISTORY:  Chest Pain;    TECHNIQUE:  Single frontal view of the chest was performed.    COMPARISON:  Prior dated 08/25/2017    FINDINGS:  Mediastinal structures are midline.  The heart is not  enlarged.  There is blunting the right costophrenic angle suggesting trace pleural fluid with adjacent atelectasis versus consolidation.                                      ED Course as of 07/14/24 1614   Sun Jul 14, 2024   1325 WBC: 8.76 [DC]   1325 Hemoglobin: 15.8 [DC]   1325 Platelet Count: 294 [DC]   1406 Troponin I: <0.006 [DC]   1406 HIV 1/2 Ag/Ab: Non-reactive [DC]   1406 Hepatitis C Ab: Non-reactive [DC]   1406 BNP: 54 [DC]   1406 Creatinine: 0.9 [DC]   1407 X-Ray Chest AP Portable  CXR shows no acute disease per my independent interpretation.     [DC]   1609 Troponin I: <0.006 [DC]      ED Course User Index  [DC] Rex Ng MD       Additional MDM:   Heart Score:    History:          Slightly suspicious.  ECG:             Normal  Age:               45-65 years  Risk factors: 1-2 risk factors  Troponin:       Less than or equal to normal limit  Heart Score = 2          EKG shows normal sinus rhythm and no acute ischemia per my independent interpretation.          MDM, Impression and Plan   54 y.o. male with chest discomfort and anxiety, resolved after Ativan given.  Noted history of methamphetamine use, although denies recently.  Workup entirely benign including negative EKGs.  I think the patient is safe for discharge.  Counseled him regarding the need to stop using methamphetamine.  Recommend follow-up PCP.         Final diagnoses:  [R07.9] Chest pain (Primary)  [F41.9] Anxiety  [F15.10] Methamphetamine abuse        ED Disposition Condition    Discharge Stable          ED Prescriptions    None       Follow-up Information       Follow up With Specialties Details Why Contact Info    Wes Singh MD Urgent Care Schedule an appointment as soon as possible for a visit   [address] [place] [id number]  [phone]                 Rex Ng MD  07/14/24 1614

## 2024-07-14 NOTE — ED TRIAGE NOTES
Patient presents to the ED via EMS with complaints of chest pain and shortness of breath that started this morning. Patient denies any cardiac history. Patient appears very anxious, patient states he thinks he might be detox off of meth, last use 2 days ago.

## 2024-07-15 LAB
OHS QRS DURATION: 80 MS
OHS QRS DURATION: 84 MS
OHS QTC CALCULATION: 396 MS
OHS QTC CALCULATION: 402 MS